# Patient Record
Sex: FEMALE | Race: WHITE | NOT HISPANIC OR LATINO
[De-identification: names, ages, dates, MRNs, and addresses within clinical notes are randomized per-mention and may not be internally consistent; named-entity substitution may affect disease eponyms.]

---

## 2017-03-09 ENCOUNTER — APPOINTMENT (OUTPATIENT)
Dept: FAMILY MEDICINE | Facility: CLINIC | Age: 52
End: 2017-03-09

## 2017-03-09 VITALS
HEIGHT: 65 IN | WEIGHT: 177 LBS | BODY MASS INDEX: 29.49 KG/M2 | SYSTOLIC BLOOD PRESSURE: 118 MMHG | DIASTOLIC BLOOD PRESSURE: 72 MMHG

## 2017-03-30 ENCOUNTER — APPOINTMENT (OUTPATIENT)
Dept: FAMILY MEDICINE | Facility: CLINIC | Age: 52
End: 2017-03-30

## 2017-04-05 ENCOUNTER — RX RENEWAL (OUTPATIENT)
Age: 52
End: 2017-04-05

## 2017-04-11 ENCOUNTER — APPOINTMENT (OUTPATIENT)
Dept: FAMILY MEDICINE | Facility: CLINIC | Age: 52
End: 2017-04-11

## 2017-04-11 ENCOUNTER — MED ADMIN CHARGE (OUTPATIENT)
Age: 52
End: 2017-04-11

## 2017-04-11 VITALS
BODY MASS INDEX: 29.49 KG/M2 | SYSTOLIC BLOOD PRESSURE: 172 MMHG | DIASTOLIC BLOOD PRESSURE: 92 MMHG | HEIGHT: 65 IN | WEIGHT: 177 LBS

## 2017-04-11 RX ORDER — TRIMETHOBENZAMIDE HYDROCHLORIDE 100 MG/ML
100 INJECTION INTRAMUSCULAR
Qty: 0 | Refills: 0 | Status: COMPLETED | OUTPATIENT
Start: 2017-04-11

## 2017-04-11 RX ADMIN — TRIMETHOBENZAMIDE HYDROCHLORIDE 0 MG/ML: 100 INJECTION INTRAMUSCULAR at 00:00

## 2017-05-08 ENCOUNTER — RX RENEWAL (OUTPATIENT)
Age: 52
End: 2017-05-08

## 2017-06-09 ENCOUNTER — RX RENEWAL (OUTPATIENT)
Age: 52
End: 2017-06-09

## 2017-06-19 ENCOUNTER — RX RENEWAL (OUTPATIENT)
Age: 52
End: 2017-06-19

## 2017-07-28 ENCOUNTER — RX RENEWAL (OUTPATIENT)
Age: 52
End: 2017-07-28

## 2017-08-01 ENCOUNTER — OUTPATIENT (OUTPATIENT)
Dept: OUTPATIENT SERVICES | Facility: HOSPITAL | Age: 52
LOS: 1 days | End: 2017-08-01
Payer: MEDICAID

## 2017-08-03 DIAGNOSIS — R69 ILLNESS, UNSPECIFIED: ICD-10-CM

## 2017-09-05 ENCOUNTER — RX RENEWAL (OUTPATIENT)
Age: 52
End: 2017-09-05

## 2017-09-08 ENCOUNTER — APPOINTMENT (OUTPATIENT)
Dept: FAMILY MEDICINE | Facility: CLINIC | Age: 52
End: 2017-09-08
Payer: MEDICAID

## 2017-09-08 VITALS
BODY MASS INDEX: 28.32 KG/M2 | WEIGHT: 170 LBS | DIASTOLIC BLOOD PRESSURE: 76 MMHG | HEIGHT: 65 IN | SYSTOLIC BLOOD PRESSURE: 136 MMHG

## 2017-09-08 PROCEDURE — 99213 OFFICE O/P EST LOW 20 MIN: CPT

## 2017-09-08 RX ORDER — VANCOMYCIN HYDROCHLORIDE 250 MG/1
250 CAPSULE ORAL 4 TIMES DAILY
Qty: 40 | Refills: 0 | Status: DISCONTINUED | COMMUNITY
Start: 2017-03-09 | End: 2017-09-08

## 2017-10-01 PROCEDURE — G9001: CPT

## 2017-10-02 ENCOUNTER — RX RENEWAL (OUTPATIENT)
Age: 52
End: 2017-10-02

## 2017-10-25 ENCOUNTER — RX RENEWAL (OUTPATIENT)
Age: 52
End: 2017-10-25

## 2017-10-26 ENCOUNTER — RX RENEWAL (OUTPATIENT)
Age: 52
End: 2017-10-26

## 2017-12-03 ENCOUNTER — RX RENEWAL (OUTPATIENT)
Age: 52
End: 2017-12-03

## 2017-12-18 ENCOUNTER — RX RENEWAL (OUTPATIENT)
Age: 52
End: 2017-12-18

## 2017-12-22 ENCOUNTER — APPOINTMENT (OUTPATIENT)
Dept: FAMILY MEDICINE | Facility: CLINIC | Age: 52
End: 2017-12-22
Payer: MEDICAID

## 2017-12-22 VITALS
SYSTOLIC BLOOD PRESSURE: 150 MMHG | WEIGHT: 170 LBS | HEIGHT: 65 IN | DIASTOLIC BLOOD PRESSURE: 80 MMHG | BODY MASS INDEX: 28.32 KG/M2

## 2017-12-22 LAB — CYTOLOGY CVX/VAG DOC THIN PREP: NORMAL

## 2017-12-22 PROCEDURE — 99214 OFFICE O/P EST MOD 30 MIN: CPT | Mod: 25

## 2017-12-22 PROCEDURE — 36415 COLL VENOUS BLD VENIPUNCTURE: CPT

## 2017-12-22 RX ORDER — ESCITALOPRAM OXALATE 10 MG/1
10 TABLET ORAL DAILY
Qty: 30 | Refills: 0 | Status: DISCONTINUED | COMMUNITY
Start: 2017-03-09 | End: 2017-12-22

## 2017-12-22 RX ORDER — FLUCONAZOLE 150 MG/1
150 TABLET ORAL
Qty: 1 | Refills: 0 | Status: DISCONTINUED | COMMUNITY
Start: 2017-09-08 | End: 2017-12-22

## 2017-12-22 RX ORDER — OXYCODONE AND ACETAMINOPHEN 5; 325 MG/1; MG/1
5-325 TABLET ORAL
Qty: 6 | Refills: 0 | Status: COMPLETED | COMMUNITY
Start: 2017-09-05

## 2017-12-22 RX ORDER — ONDANSETRON HYDROCHLORIDE 8 MG/1
8 TABLET, FILM COATED ORAL 3 TIMES DAILY
Refills: 0 | Status: DISCONTINUED | COMMUNITY
Start: 2017-03-09 | End: 2017-12-22

## 2017-12-22 RX ORDER — OMEPRAZOLE 40 MG/1
40 CAPSULE, DELAYED RELEASE ORAL
Qty: 30 | Refills: 0 | Status: COMPLETED | COMMUNITY
Start: 2017-04-18

## 2017-12-22 RX ORDER — CLONAZEPAM 1 MG/1
1 TABLET ORAL
Qty: 6 | Refills: 0 | Status: COMPLETED | COMMUNITY
Start: 2017-09-05

## 2017-12-23 LAB — POTASSIUM SERPL-SCNC: 3.4 MMOL/L

## 2018-01-16 ENCOUNTER — RX RENEWAL (OUTPATIENT)
Age: 53
End: 2018-01-16

## 2018-01-17 ENCOUNTER — APPOINTMENT (OUTPATIENT)
Dept: FAMILY MEDICINE | Facility: CLINIC | Age: 53
End: 2018-01-17
Payer: MEDICAID

## 2018-01-17 VITALS
OXYGEN SATURATION: 98 % | SYSTOLIC BLOOD PRESSURE: 122 MMHG | DIASTOLIC BLOOD PRESSURE: 72 MMHG | HEIGHT: 65 IN | BODY MASS INDEX: 26.66 KG/M2 | HEART RATE: 93 BPM | WEIGHT: 160 LBS

## 2018-01-17 PROCEDURE — 99213 OFFICE O/P EST LOW 20 MIN: CPT

## 2018-01-17 RX ORDER — CIPROFLOXACIN HYDROCHLORIDE 500 MG/1
500 TABLET, FILM COATED ORAL
Qty: 14 | Refills: 0 | Status: DISCONTINUED | COMMUNITY
Start: 2017-12-15

## 2018-04-01 ENCOUNTER — OUTPATIENT (OUTPATIENT)
Dept: OUTPATIENT SERVICES | Facility: HOSPITAL | Age: 53
LOS: 1 days | End: 2018-04-01
Payer: MEDICAID

## 2018-04-01 PROCEDURE — G9001: CPT

## 2018-04-20 DIAGNOSIS — R69 ILLNESS, UNSPECIFIED: ICD-10-CM

## 2018-05-14 ENCOUNTER — APPOINTMENT (OUTPATIENT)
Dept: FAMILY MEDICINE | Facility: CLINIC | Age: 53
End: 2018-05-14
Payer: MEDICAID

## 2018-05-14 VITALS — HEIGHT: 65 IN | WEIGHT: 153 LBS | BODY MASS INDEX: 25.49 KG/M2 | TEMPERATURE: 98.3 F

## 2018-05-14 PROCEDURE — 99213 OFFICE O/P EST LOW 20 MIN: CPT | Mod: 25

## 2018-05-14 PROCEDURE — 96372 THER/PROPH/DIAG INJ SC/IM: CPT

## 2018-05-14 RX ORDER — TRIMETHOBENZAMIDE HYDROCHLORIDE 100 MG/ML
100 INJECTION INTRAMUSCULAR
Qty: 0 | Refills: 0 | Status: COMPLETED | OUTPATIENT
Start: 2018-05-14

## 2018-05-14 RX ADMIN — TRIMETHOBENZAMIDE HYDROCHLORIDE 0 MG/ML: 100 INJECTION INTRAMUSCULAR at 00:00

## 2018-06-01 RX ORDER — CLOBETASOL PROPIONATE 0.5 MG/G
0.05 AEROSOL, FOAM TOPICAL TWICE DAILY
Qty: 1 | Refills: 0 | Status: DISCONTINUED | COMMUNITY
Start: 2018-05-15 | End: 2018-06-01

## 2018-06-11 ENCOUNTER — MEDICATION RENEWAL (OUTPATIENT)
Age: 53
End: 2018-06-11

## 2018-06-11 RX ORDER — CLOBETASOL PROPIONATE 0.5 MG/G
0.05 CREAM TOPICAL TWICE DAILY
Qty: 1 | Refills: 0 | Status: DISCONTINUED | COMMUNITY
Start: 2018-06-01 | End: 2018-06-11

## 2018-07-09 ENCOUNTER — RX RENEWAL (OUTPATIENT)
Age: 53
End: 2018-07-09

## 2018-11-15 ENCOUNTER — APPOINTMENT (OUTPATIENT)
Dept: FAMILY MEDICINE | Facility: CLINIC | Age: 53
End: 2018-11-15
Payer: MEDICAID

## 2018-11-15 VITALS
HEART RATE: 119 BPM | WEIGHT: 160 LBS | RESPIRATION RATE: 18 BRPM | OXYGEN SATURATION: 98 % | DIASTOLIC BLOOD PRESSURE: 70 MMHG | HEIGHT: 65 IN | SYSTOLIC BLOOD PRESSURE: 120 MMHG | BODY MASS INDEX: 26.66 KG/M2

## 2018-11-15 VITALS — TEMPERATURE: 99.2 F

## 2018-11-15 PROCEDURE — 99213 OFFICE O/P EST LOW 20 MIN: CPT

## 2018-11-15 RX ORDER — HALOBETASOL PROPIONATE 0.5 MG/G
0.05 CREAM TOPICAL
Qty: 1 | Refills: 0 | Status: DISCONTINUED | COMMUNITY
Start: 2018-06-11 | End: 2018-11-15

## 2018-11-15 RX ORDER — METOPROLOL SUCCINATE 25 MG/1
25 TABLET, EXTENDED RELEASE ORAL DAILY
Qty: 90 | Refills: 0 | Status: DISCONTINUED | COMMUNITY
Start: 2017-03-09 | End: 2018-11-15

## 2018-11-15 RX ORDER — NAPROXEN 500 MG/1
500 TABLET ORAL
Qty: 20 | Refills: 0 | Status: DISCONTINUED | COMMUNITY
Start: 2018-01-17 | End: 2018-11-15

## 2018-11-15 RX ORDER — TEMAZEPAM 15 MG/1
15 CAPSULE ORAL
Qty: 30 | Refills: 0 | Status: DISCONTINUED | COMMUNITY
Start: 2017-12-22 | End: 2018-11-15

## 2018-11-15 RX ORDER — AMLODIPINE BESYLATE 5 MG/1
5 TABLET ORAL DAILY
Qty: 30 | Refills: 0 | Status: DISCONTINUED | COMMUNITY
Start: 2017-03-09 | End: 2018-11-15

## 2018-11-15 NOTE — HISTORY OF PRESENT ILLNESS
[FreeTextEntry1] : Here for follow-up and medication refills [de-identified] : Ms. Red presents for follow-up, needs medication refills. \siddharth Has history of Gastroparesis with multiple hospitalizations last year. Explains that she has been very nauseous, vomiting last night and worsening reflux.  States she vomited ~40 times between last night and today.  Sometimes clear, sometimes dark.  Ate pasta with red sauce last night and had some coca cola to settle stomach.  Has been taking Zofran for relief of nausea. Last dose ~12PM today.  \par \par Also reports episodes feeling very shakey and episodes of sweating.  Feels very anxious and took multiple hot showers today to calm herself down.    \siddharth Has been out of omeprazole.  \par \par Dr. Campos-Gastro last visit 3 months ago, other GI Dr. Shoemaker.\siddharth Stopped taking her BP medications because her readings have been okay. \par \par

## 2018-11-15 NOTE — PHYSICAL EXAM
[Well Nourished] : well nourished [Well Developed] : well developed [Normal Sclera/Conjunctiva] : normal sclera/conjunctiva [Normal Outer Ear/Nose] : the outer ears and nose were normal in appearance [No Respiratory Distress] : no respiratory distress  [Clear to Auscultation] : lungs were clear to auscultation bilaterally [No Accessory Muscle Use] : no accessory muscle use [Regular Rhythm] : with a regular rhythm [Normal S1, S2] : normal S1 and S2 [No Edema] : there was no peripheral edema [No Extremity Clubbing/Cyanosis] : no extremity clubbing/cyanosis [Soft] : abdomen soft [Non Tender] : non-tender [Non-distended] : non-distended [No Masses] : no abdominal mass palpated [Normal Bowel Sounds] : normal bowel sounds [Alert and Oriented x3] : oriented to person, place, and time [de-identified] : mild erythema throat, no exudates [de-identified] : tachycardic [de-identified] : appears anxious

## 2018-11-15 NOTE — REVIEW OF SYSTEMS
[Fever] : no fever [Chills] : no chills [Nausea] : nausea [Vomiting] : vomiting [Heartburn] : heartburn [Suicidal] : not suicidal [Insomnia] : no insomnia [Anxiety] : anxiety [Depression] : no depression [Negative] : Genitourinary [FreeTextEntry2] : sweating and shakey last night

## 2018-11-15 NOTE — PLAN
[FreeTextEntry1] : Advised patient to seek medical evaluation at ER for symptoms of multiple episodes of vomiting, given history of gastroparesis.  Patient most likely dehydrated, unable to tolerate fluids today.   Discussed risks of worsening symptoms, possible infection and death if not evaluated.  Patient expressed understanding. \par \par Advised patient needs to see GI.  \par \par Patient is also going to make appointment for therapy.  Patient would like to see someone for her anxiety symptoms.  Denies suicidal ideations, intention to harm self or others.  States her main symptom is anxiety.  \par \par Given short supply of Xanax for acute anxiety attack. Patient has tolerated Klonopin in the past without problem.\par I-stop checked.\par Advised patient she needs evaluation for worsening symptoms, may require a long term medication for anxiety management.  She expressed understanding.

## 2019-01-14 ENCOUNTER — APPOINTMENT (OUTPATIENT)
Dept: FAMILY MEDICINE | Facility: CLINIC | Age: 54
End: 2019-01-14
Payer: MEDICAID

## 2019-01-14 VITALS
SYSTOLIC BLOOD PRESSURE: 140 MMHG | WEIGHT: 160 LBS | HEART RATE: 102 BPM | HEIGHT: 65 IN | BODY MASS INDEX: 26.66 KG/M2 | DIASTOLIC BLOOD PRESSURE: 90 MMHG

## 2019-01-14 DIAGNOSIS — Z01.818 ENCOUNTER FOR OTHER PREPROCEDURAL EXAMINATION: ICD-10-CM

## 2019-01-14 DIAGNOSIS — Z87.440 PERSONAL HISTORY OF URINARY (TRACT) INFECTIONS: ICD-10-CM

## 2019-01-14 DIAGNOSIS — Z86.19 PERSONAL HISTORY OF OTHER INFECTIOUS AND PARASITIC DISEASES: ICD-10-CM

## 2019-01-14 PROCEDURE — 99215 OFFICE O/P EST HI 40 MIN: CPT

## 2019-01-14 NOTE — COUNSELING
[Behavioral health counseling provided] : behavioral health  [Engage in a relaxing activity] : Engage in a relaxing activity [Good understanding] : Patient has a good understanding of lifestyle changes and the steps needed to achieve self management goals [de-identified] : Stable.  Refill Rx, consider alternative relaxation techniques, including deep breathing, meditation, yoga, acupuncture. Allowed to vent, emotional support provided.\par

## 2019-01-14 NOTE — HISTORY OF PRESENT ILLNESS
[de-identified] : went to [facility] 3x for alcoholism and dehydration, 3 wks in a row, 1st time 6 days, 2nd tim3 3 days, then 3rd time just an ER visit\par has appt w/ psych at [facility]

## 2019-01-14 NOTE — PHYSICAL EXAM
[No Acute Distress] : no acute distress [Well Nourished] : well nourished [Normal Sclera/Conjunctiva] : normal sclera/conjunctiva [EOMI] : extraocular movements intact [No Respiratory Distress] : no respiratory distress  [Clear to Auscultation] : lungs were clear to auscultation bilaterally [No Accessory Muscle Use] : no accessory muscle use [Normal Rate] : normal rate  [Regular Rhythm] : with a regular rhythm [Normal Gait] : normal gait [Normal Affect] : the affect was normal [Alert and Oriented x3] : oriented to person, place, and time [Normal Insight/Judgement] : insight and judgment were intact [de-identified] : tachycardic

## 2019-01-25 ENCOUNTER — APPOINTMENT (OUTPATIENT)
Dept: FAMILY MEDICINE | Facility: CLINIC | Age: 54
End: 2019-01-25

## 2019-02-01 ENCOUNTER — APPOINTMENT (OUTPATIENT)
Dept: FAMILY MEDICINE | Facility: CLINIC | Age: 54
End: 2019-02-01

## 2019-02-06 ENCOUNTER — RX RENEWAL (OUTPATIENT)
Age: 54
End: 2019-02-06

## 2019-03-04 ENCOUNTER — RX RENEWAL (OUTPATIENT)
Age: 54
End: 2019-03-04

## 2019-04-09 ENCOUNTER — LABORATORY RESULT (OUTPATIENT)
Age: 54
End: 2019-04-09

## 2019-04-09 ENCOUNTER — APPOINTMENT (OUTPATIENT)
Dept: FAMILY MEDICINE | Facility: CLINIC | Age: 54
End: 2019-04-09
Payer: MEDICAID

## 2019-04-09 ENCOUNTER — NON-APPOINTMENT (OUTPATIENT)
Age: 54
End: 2019-04-09

## 2019-04-09 VITALS
WEIGHT: 160 LBS | BODY MASS INDEX: 26.66 KG/M2 | DIASTOLIC BLOOD PRESSURE: 80 MMHG | HEART RATE: 85 BPM | HEIGHT: 65 IN | RESPIRATION RATE: 18 BRPM | OXYGEN SATURATION: 98 % | SYSTOLIC BLOOD PRESSURE: 125 MMHG

## 2019-04-09 DIAGNOSIS — J30.2 OTHER SEASONAL ALLERGIC RHINITIS: ICD-10-CM

## 2019-04-09 PROCEDURE — 93000 ELECTROCARDIOGRAM COMPLETE: CPT

## 2019-04-09 PROCEDURE — 36415 COLL VENOUS BLD VENIPUNCTURE: CPT

## 2019-04-09 PROCEDURE — 99396 PREV VISIT EST AGE 40-64: CPT | Mod: 25

## 2019-04-09 RX ORDER — ALPRAZOLAM 0.25 MG/1
0.25 TABLET ORAL
Qty: 5 | Refills: 0 | Status: DISCONTINUED | COMMUNITY
Start: 2018-11-15 | End: 2019-04-09

## 2019-04-09 RX ORDER — DISULFIRAM 250 MG/1
250 TABLET ORAL DAILY
Qty: 30 | Refills: 3 | Status: DISCONTINUED | COMMUNITY
Start: 2019-01-14 | End: 2019-04-09

## 2019-04-09 RX ORDER — ONDANSETRON 4 MG/1
4 TABLET ORAL EVERY 6 HOURS
Qty: 20 | Refills: 0 | Status: DISCONTINUED | COMMUNITY
Start: 2017-12-22 | End: 2019-04-09

## 2019-04-09 RX ORDER — ONDANSETRON 4 MG/1
4 TABLET, ORALLY DISINTEGRATING ORAL EVERY 8 HOURS
Qty: 6 | Refills: 0 | Status: DISCONTINUED | COMMUNITY
Start: 2017-04-11 | End: 2019-04-09

## 2019-04-09 RX ORDER — HYDROXYZINE HYDROCHLORIDE 10 MG/1
10 TABLET ORAL
Qty: 14 | Refills: 0 | Status: DISCONTINUED | COMMUNITY
Start: 2019-01-03 | End: 2019-04-09

## 2019-04-09 NOTE — REVIEW OF SYSTEMS
[Itching] : itching [Insomnia] : insomnia [Negative] : ENT [Postnasal Drip] : no postnasal drip [de-identified] : some episodes of anxiety

## 2019-04-09 NOTE — PHYSICAL EXAM
[No Acute Distress] : no acute distress [Well Nourished] : well nourished [Well-Appearing] : well-appearing [Well Developed] : well developed [Normal Sclera/Conjunctiva] : normal sclera/conjunctiva [PERRL] : pupils equal round and reactive to light [Normal Oropharynx] : the oropharynx was normal [Normal TMs] : both tympanic membranes were normal [Supple] : supple [No Lymphadenopathy] : no lymphadenopathy [Thyroid Normal, No Nodules] : the thyroid was normal and there were no nodules present [No Respiratory Distress] : no respiratory distress  [Clear to Auscultation] : lungs were clear to auscultation bilaterally [No Accessory Muscle Use] : no accessory muscle use [Normal Rate] : normal rate  [Regular Rhythm] : with a regular rhythm [Normal S1, S2] : normal S1 and S2 [No Edema] : there was no peripheral edema [No Extremity Clubbing/Cyanosis] : no extremity clubbing/cyanosis [Normal Appearance] : normal in appearance [No Nipple Discharge] : no nipple discharge [No Axillary Lymphadenopathy] : no axillary lymphadenopathy [Soft] : abdomen soft [Non Tender] : non-tender [Non-distended] : non-distended [No Masses] : no abdominal mass palpated [Normal Bowel Sounds] : normal bowel sounds [Normal Affect] : the affect was normal [Alert and Oriented x3] : oriented to person, place, and time [Normal Mood] : the mood was normal [de-identified] : No sinus pressure

## 2019-04-09 NOTE — HISTORY OF PRESENT ILLNESS
[FreeTextEntry1] : Here for annual physical [de-identified] : Here for annual physical.  \par Finished with inpatient rehab 4 weeks, Mid March for alcohol abuse.  Setting up counseling with [facility], has been going to meetings.  Has not had any alcohol since mid february.  \par \par Had episode of Gastroparesis last week with nausea/episodes of nausea/vomitting-used Zofran.  Feeling better now.  Has not seen Gastroenterology in awhile.  Brings labwork from rehab.  Mild anemia, will recheck today. \par \par Notes periods have become shorter and more spread out, last one was in March.  Previously in December 2018, due to see Gyn.  \par \par Medications and allergies reviewed.  Taking buspar once daily, sometimes twice daily. \par \par \par

## 2019-04-09 NOTE — HEALTH RISK ASSESSMENT
[Patient reported mammogram was normal] : Patient reported mammogram was normal [Patient reported PAP Smear was normal] : Patient reported PAP Smear was normal [Intercurrent ED visits] : went to ED [Intercurrent hospitalizations] : was admitted to the hospital  [HIV test declined] : HIV test declined [Alone] : lives alone [# of Members in Household ___] :  household currently consist of [unfilled] member(s) [Single] : single [] : No [de-identified] : Alcohol withdrawl, rehab [de-identified] : Quit [YearQuit] : 2001 [de-identified] : None since mid February [de-identified] : Able to eat this week; limits caffeine [MammogramComments] : 2-3 years ago [ColonoscopyComments] : not yet [HIVComments] : had performed at rehab

## 2019-04-09 NOTE — PLAN
[FreeTextEntry1] : Will follow-up labwork. EKG reviewed, sinus, similar to prior, no changes. \par Following with [facility]. \par Sleep hygiene and anxiety discussed.  Advised patient can take Buspar twice daily as directed.  Sleep hygiene discussed, handout given.  \par \par Advised follow-up with GI for further evaluation of Gastroparesis, patient also needs colonoscopy for colon cancer screening. \par \par Breast exam performed today-Mammogram ordered.  Patient declined STD testing-recently performed at rehab facility. Referral given to Gyn for annual exam, patient perimenopausal.  \par \par Advised to call if any worsening symptoms. Ms. Red expressed understanding of the plan.

## 2019-04-10 LAB
25(OH)D3 SERPL-MCNC: 27 NG/ML
ALBUMIN SERPL ELPH-MCNC: 4.4 G/DL
ALP BLD-CCNC: 53 U/L
ALT SERPL-CCNC: 18 U/L
ANION GAP SERPL CALC-SCNC: 12 MMOL/L
APPEARANCE: ABNORMAL
AST SERPL-CCNC: 23 U/L
BASOPHILS # BLD AUTO: 0.06 K/UL
BASOPHILS NFR BLD AUTO: 1.2 %
BILIRUB SERPL-MCNC: 0.6 MG/DL
BILIRUBIN URINE: NEGATIVE
BLOOD URINE: NEGATIVE
BUN SERPL-MCNC: 11 MG/DL
CALCIUM SERPL-MCNC: 9.3 MG/DL
CHLORIDE SERPL-SCNC: 99 MMOL/L
CHOLEST SERPL-MCNC: 193 MG/DL
CHOLEST/HDLC SERPL: 2.3 RATIO
CO2 SERPL-SCNC: 27 MMOL/L
COLOR: YELLOW
CREAT SERPL-MCNC: 0.75 MG/DL
EOSINOPHIL # BLD AUTO: 0.15 K/UL
EOSINOPHIL NFR BLD AUTO: 3 %
ESTIMATED AVERAGE GLUCOSE: 100 MG/DL
GLUCOSE QUALITATIVE U: NEGATIVE
GLUCOSE SERPL-MCNC: 94 MG/DL
HBA1C MFR BLD HPLC: 5.1 %
HCT VFR BLD CALC: 32.8 %
HDLC SERPL-MCNC: 85 MG/DL
HGB BLD-MCNC: 10.4 G/DL
IMM GRANULOCYTES NFR BLD AUTO: 0.2 %
KETONES URINE: NEGATIVE
LDLC SERPL CALC-MCNC: 92 MG/DL
LEUKOCYTE ESTERASE URINE: NEGATIVE
LYMPHOCYTES # BLD AUTO: 1.54 K/UL
LYMPHOCYTES NFR BLD AUTO: 31.1 %
MAN DIFF?: NORMAL
MCHC RBC-ENTMCNC: 26.9 PG
MCHC RBC-ENTMCNC: 31.7 GM/DL
MCV RBC AUTO: 85 FL
MONOCYTES # BLD AUTO: 0.55 K/UL
MONOCYTES NFR BLD AUTO: 11.1 %
NEUTROPHILS # BLD AUTO: 2.64 K/UL
NEUTROPHILS NFR BLD AUTO: 53.4 %
NITRITE URINE: NEGATIVE
PH URINE: 6
PLATELET # BLD AUTO: 242 K/UL
POTASSIUM SERPL-SCNC: 3.7 MMOL/L
PROT SERPL-MCNC: 7.2 G/DL
PROTEIN URINE: ABNORMAL
RBC # BLD: 3.86 M/UL
RBC # FLD: 21 %
SODIUM SERPL-SCNC: 138 MMOL/L
SPECIFIC GRAVITY URINE: 1.03
TRIGL SERPL-MCNC: 79 MG/DL
TSH SERPL-ACNC: 1.76 UIU/ML
UROBILINOGEN URINE: NORMAL
WBC # FLD AUTO: 4.95 K/UL

## 2019-04-11 ENCOUNTER — RX RENEWAL (OUTPATIENT)
Age: 54
End: 2019-04-11

## 2019-04-11 ENCOUNTER — OTHER (OUTPATIENT)
Age: 54
End: 2019-04-11

## 2019-04-23 ENCOUNTER — RX RENEWAL (OUTPATIENT)
Age: 54
End: 2019-04-23

## 2019-05-06 ENCOUNTER — APPOINTMENT (OUTPATIENT)
Dept: FAMILY MEDICINE | Facility: CLINIC | Age: 54
End: 2019-05-06

## 2019-05-13 ENCOUNTER — RX RENEWAL (OUTPATIENT)
Age: 54
End: 2019-05-13

## 2019-05-22 ENCOUNTER — RX RENEWAL (OUTPATIENT)
Age: 54
End: 2019-05-22

## 2019-05-24 ENCOUNTER — APPOINTMENT (OUTPATIENT)
Dept: FAMILY MEDICINE | Facility: CLINIC | Age: 54
End: 2019-05-24

## 2019-06-03 ENCOUNTER — RX RENEWAL (OUTPATIENT)
Age: 54
End: 2019-06-03

## 2019-06-11 ENCOUNTER — APPOINTMENT (OUTPATIENT)
Dept: FAMILY MEDICINE | Facility: CLINIC | Age: 54
End: 2019-06-11

## 2019-06-17 ENCOUNTER — RX RENEWAL (OUTPATIENT)
Age: 54
End: 2019-06-17

## 2019-06-25 ENCOUNTER — RX RENEWAL (OUTPATIENT)
Age: 54
End: 2019-06-25

## 2019-07-07 PROBLEM — Z86.19 HISTORY OF CLOSTRIDIUM DIFFICILE COLITIS: Status: RESOLVED | Noted: 2017-03-09 | Resolved: 2019-07-07

## 2019-07-15 ENCOUNTER — RX RENEWAL (OUTPATIENT)
Age: 54
End: 2019-07-15

## 2019-07-26 ENCOUNTER — RX RENEWAL (OUTPATIENT)
Age: 54
End: 2019-07-26

## 2019-07-29 ENCOUNTER — APPOINTMENT (OUTPATIENT)
Dept: FAMILY MEDICINE | Facility: CLINIC | Age: 54
End: 2019-07-29
Payer: MEDICAID

## 2019-07-29 VITALS
HEIGHT: 65 IN | WEIGHT: 162 LBS | SYSTOLIC BLOOD PRESSURE: 122 MMHG | DIASTOLIC BLOOD PRESSURE: 80 MMHG | BODY MASS INDEX: 26.99 KG/M2

## 2019-07-29 PROCEDURE — 99214 OFFICE O/P EST MOD 30 MIN: CPT

## 2019-08-25 ENCOUNTER — RX RENEWAL (OUTPATIENT)
Age: 54
End: 2019-08-25

## 2019-08-26 ENCOUNTER — APPOINTMENT (OUTPATIENT)
Dept: ORTHOPEDIC SURGERY | Facility: CLINIC | Age: 54
End: 2019-08-26
Payer: MEDICAID

## 2019-08-26 VITALS
BODY MASS INDEX: 26.99 KG/M2 | SYSTOLIC BLOOD PRESSURE: 143 MMHG | HEART RATE: 116 BPM | WEIGHT: 162 LBS | DIASTOLIC BLOOD PRESSURE: 86 MMHG | HEIGHT: 65 IN

## 2019-08-26 DIAGNOSIS — K31.84 GASTROPARESIS: ICD-10-CM

## 2019-08-26 PROCEDURE — 99204 OFFICE O/P NEW MOD 45 MIN: CPT

## 2019-08-26 NOTE — PHYSICAL EXAM
[de-identified] : Constitutional: Well-nourished, well-developed, No acute distress\par Respiratory:  Good respiratory effort, no SOB\par Lymphatic: No regional lymphadenopathy, no lymphedema\par Psychiatric: Pleasant and normal affect, alert and oriented x3\par Skin: Clean dry and intact B/L UE/LE\par Musculoskeletal: normal except where as noted in regional exam\par \par Constitutional: Well-nourished, well-developed, No acute distress\par Respiratory:  Good respiratory effort, no SOB\par Lymphatic: No regional lymphadenopathy, no lymphedema\par Psychiatric: Pleasant and normal affect, alert and oriented x3\par Skin: Clean dry and intact B/L UE/LE\par Musculoskeletal: normal except where as noted in regional exam\par \par Cervical Spine Exam\par APPEARANCE: no marked deformities or malalignment, normal curvature, good posture\par POSITIVE TENDERNESS: + Bilateral upper trapezius, levator scapula, rhomboid major, and rhomboid minor, + spasm noted in the same muscles.\par NONTENDER: no bony midline tenderness.\par ROM: limited in all planes, most notably in flexion and sidebending due to pain\par RESISTIVE TESTING: painful 4/5 resisted ext, bilateral sidebending, rotation and shoulder shrug , 5/5 flexion \par SPECIAL TESTS: neg Spurling's b/l\par Vasc: 2+ radial pulse b/l\par Neuro: C5 - T1 intact to motor, DTRs 2+/4 biceps, triceps, brachioradialis\par Sensation: Intact to light touch throughout b/l UE\par \par Thoracic Spine Exam:\par \par normal curvature and normal alignment. good posture. no midline bony tenderness, + marked spasm and associated tenderness of bilateral paraspinal and periscapular muscles.  ROM mildly limited in sidebending and rotation due to noted spasm\par \par \par B/L Elbows:  No asymmetry, malalignment, or swelling, Full ROM, 5/5 strength in flexion/ext, pronation/supination, Joints stable\par B/L Wrist and Hand:  No asymmetry, malalignment, or swelling, Full ROM, 5/5 strength in wrist and long finger flexion/ext, radial/ulnar deviation, Joints stable\par \par Left Shoulder:\par APPEARANCE: no marked deformities, no swelling or malalignment\par POSITIVE TENDERNESS: supraspinatus\par NONTENDER:  infraspinatus, teres minor. biceps. anterior and posterior capsule. AC joint. \par ROM: full with mild painful arc past 60 degrees, no scapular winging or dyskinesia present\par RESISTIVE TESTING: MMT 4+/5 ER and empty can, 5/5 IR. painless 5/5 resisted flex/ext, horizontal abd/add \par SPECIAL TESTS: + Whitehead and Neers, mildly + cross arm adduction, neg Speeds, neg Nix's neg Drop Arm, neg Apprehension. neg apley's scratch test\par Vasc: 2+ radial pulse\par Neuro: AIN, PIN, Ulnar nerve intact to motor, DTRs 2+/4 biceps, triceps, brachioradialis\par Sensation: Intact to light touch throughout\par \par Right Shoulder:\par APPEARANCE: no marked deformities, no swelling or malalignment\par POSITIVE TENDERNESS: supraspinatus\par NONTENDER:  infraspinatus, teres minor. biceps. anterior and posterior capsule. AC joint. \par ROM: full with mild painful arc past 60 degrees, no scapular winging or dyskinesia present\par RESISTIVE TESTING: MMT 4+/5 ER and empty can, 5/5 IR. painless 5/5 resisted flex/ext, horizontal abd/add \par SPECIAL TESTS: + Whitehead and Neers, mildly + cross arm adduction, neg Speeds, neg Nix's neg Drop Arm, neg Apprehension. neg apley's scratch test\par Vasc: 2+ radial pulse\par Neuro: AIN, PIN, Ulnar nerve intact to motor, DTRs 2+/4 biceps, triceps, brachioradialis\par Sensation: Intact to light touch throughout\par \par Lumbar Spine Exam\par \par APPEARANCE: no marked deformities or malalignment, normal curvature of the lumbosacral spine. no marked deformities. good posture\par POSITIVE TENDERNESS: + Bilateral lumbar tenderness and spasm noted in erector spinae and quadratus lumborum\par NONTENDER: no bony midline tenderness\par ROM: full, although painful at end range of flexion and extension\par RESISTIVE TESTING: painless 5/5 resisted flex/ext, sidebending b/l, and rotation\par SPECIAL TESTS: neg SLR b/l, neg NANCIE b/l, neg Trendelenburg b/l \par PULSES: 2+ DP/PT pulses\par NEURO:  L1 - S2 intact to sensation and motor, DTRs 2+/4 patella and achilles\par \par B/L Hips: No asymmetry, malalignment, or swelling, Full ROM, 5/5 strength in flexion/ext, IR/ER, Abd/Add, Joints stable\par B/L Knees: No asymmetry, malalignment, or swelling, Full ROM, 5/5 strength in flexion/ext, Joints stable\par B/L Ankles: No asymmetry, malalignment, or swelling, Full ROM, 5/5 strength in DF/PF/Inv/Ev, Joints stable\par BIOMECHANICAL EXAM: no marked leg length discrepancy, moderate hip abductor weakness b/l, no marked foot pronation, Normal gait and station\par \par

## 2019-08-26 NOTE — CONSULT LETTER
[Dear  ___] : Dear  [unfilled], [Consult Letter:] : I had the pleasure of evaluating your patient, [unfilled]. [Please see my note below.] : Please see my note below. [Sincerely,] : Sincerely, [FreeTextEntry2] : PCP [FreeTextEntry3] : Jersey Winkler DO, ATC\par Primary Care Sports Medicine\par [facility]

## 2019-08-26 NOTE — DISCUSSION/SUMMARY
[de-identified] : Patient was seen today for her diffuse body pain is one month status post fall. She has spasm of the cervical, thoracic, and lumbar paraspinal muscles. These are secondary to her fall and resultant whiplash injury. Patient also has bilateral shoulder pain likely due to mild rotator cuff tendinitis and subacromial impingement. Patient does not appear to have any acute tendon tears, or other significant pathology that'll require surgical intervention. At this time recommend a course of physical therapy as patient has not been doing any treatment for the last 4 weeks since her fall. Patient will continue take over-the-counter medications as needed for pain, she has history of gastroparesis, would recommend Tylenol extra strength as needed for pain. Patient still has muscle relaxants from her ER visit, she is advised that these causes drowsiness/sedation and should be taken in the evenings only. Recommend followup in one month if she has any persisting pain.  Patient appreciates and agrees with current plan.\par \par This note was generated using dragon medical dictation software.  A reasonable effort has been made for proofreading its contents, but typos may still remain.  If there are any questions or points of clarification needed please notify my office.

## 2019-08-27 ENCOUNTER — RX RENEWAL (OUTPATIENT)
Age: 54
End: 2019-08-27

## 2019-08-30 ENCOUNTER — APPOINTMENT (OUTPATIENT)
Dept: FAMILY MEDICINE | Facility: CLINIC | Age: 54
End: 2019-08-30
Payer: MEDICAID

## 2019-08-30 VITALS
DIASTOLIC BLOOD PRESSURE: 80 MMHG | BODY MASS INDEX: 26.99 KG/M2 | TEMPERATURE: 98.3 F | WEIGHT: 162 LBS | SYSTOLIC BLOOD PRESSURE: 140 MMHG | HEART RATE: 123 BPM | HEIGHT: 65 IN | OXYGEN SATURATION: 98 %

## 2019-08-30 VITALS — OXYGEN SATURATION: 97 % | HEART RATE: 99 BPM

## 2019-08-30 VITALS — SYSTOLIC BLOOD PRESSURE: 126 MMHG | DIASTOLIC BLOOD PRESSURE: 76 MMHG

## 2019-08-30 PROCEDURE — 99214 OFFICE O/P EST MOD 30 MIN: CPT

## 2019-08-30 RX ORDER — MELOXICAM 15 MG/1
15 TABLET ORAL DAILY
Qty: 14 | Refills: 0 | Status: DISCONTINUED | COMMUNITY
Start: 2019-08-13 | End: 2019-08-30

## 2019-08-30 NOTE — REVIEW OF SYSTEMS
[Muscle Pain] : muscle pain [Negative] : Cardiovascular [FreeTextEntry4] : some episodes of nosebleeds [FreeTextEntry9] : Ongoing soreness in back, ankels.

## 2019-08-30 NOTE — HISTORY OF PRESENT ILLNESS
[FreeTextEntry1] : Here for follow-up [de-identified] : Here for follow-up, saw Sports medicine this week, follow up for muscle pain following fall.  Going to start PT Sept 9th. \par \siddharth Has been taking Motrin, meloxicam and muscle relaxers did not work.   Reports she has some mild nosebleeds when she blows her nose.  None today.  Also with some nasal tenderness.  Had facial CT at ER.\par Establishing with new therapist.  States she has not used any alcohol. \par \par Trying to make appointment with GI for further evaluation of gastroparesis.\par Medications and allergies reviewed.\par

## 2019-08-30 NOTE — PHYSICAL EXAM
[Normal Oropharynx] : the oropharynx was normal [Normal] : soft, non-tender, non-distended, no masses palpated, no HSM and normal bowel sounds [Normal Sclera/Conjunctiva] : normal sclera/conjunctiva [Normal Outer Ear/Nose] : the outer ears and nose were normal in appearance [Normal Nasal Mucosa] : the nasal mucosa was normal [No Edema] : there was no peripheral edema [No Extremity Clubbing/Cyanosis] : no extremity clubbing/cyanosis [de-identified] : no sinus tenderness to palpation; no facial bruising.   [de-identified] : Paraspinal tenderness to palpation, lumbar, thoracic and cervical areas.

## 2019-08-30 NOTE — PLAN
[FreeTextEntry1] : No active nasal bleeding.  Patient had facial CT performed at ER [facility] after fall. \par Advised ENT evaluation for nose bleeds. \par Repeat HR and BP improved. \par \par Has appt with new counselor next week. \par Advised to see GI for follow-up of Gastroparesis. \par Starting PT.\par Advised to try trial of heat/warm compresses for muscle tenderness as needed.  Advised to cover compress, not place directly on skin and not to apply for more than 15 minutes at a time.  Patient expressed understanding.\par

## 2019-09-20 ENCOUNTER — RX RENEWAL (OUTPATIENT)
Age: 54
End: 2019-09-20

## 2019-10-07 ENCOUNTER — APPOINTMENT (OUTPATIENT)
Dept: ORTHOPEDIC SURGERY | Facility: CLINIC | Age: 54
End: 2019-10-07

## 2019-10-22 ENCOUNTER — RX RENEWAL (OUTPATIENT)
Age: 54
End: 2019-10-22

## 2019-10-23 ENCOUNTER — RX RENEWAL (OUTPATIENT)
Age: 54
End: 2019-10-23

## 2019-10-25 ENCOUNTER — RX RENEWAL (OUTPATIENT)
Age: 54
End: 2019-10-25

## 2019-10-28 ENCOUNTER — RX RENEWAL (OUTPATIENT)
Age: 54
End: 2019-10-28

## 2019-10-31 ENCOUNTER — APPOINTMENT (OUTPATIENT)
Dept: FAMILY MEDICINE | Facility: CLINIC | Age: 54
End: 2019-10-31
Payer: MEDICAID

## 2019-10-31 VITALS
OXYGEN SATURATION: 97 % | RESPIRATION RATE: 17 BRPM | SYSTOLIC BLOOD PRESSURE: 160 MMHG | HEART RATE: 83 BPM | DIASTOLIC BLOOD PRESSURE: 70 MMHG | WEIGHT: 150 LBS | HEIGHT: 65 IN | BODY MASS INDEX: 24.99 KG/M2

## 2019-10-31 DIAGNOSIS — Z87.09 PERSONAL HISTORY OF OTHER DISEASES OF THE RESPIRATORY SYSTEM: ICD-10-CM

## 2019-10-31 DIAGNOSIS — M62.838 OTHER MUSCLE SPASM: ICD-10-CM

## 2019-10-31 PROCEDURE — 99214 OFFICE O/P EST MOD 30 MIN: CPT | Mod: 25

## 2019-10-31 PROCEDURE — G0442 ANNUAL ALCOHOL SCREEN 15 MIN: CPT

## 2019-10-31 NOTE — REVIEW OF SYSTEMS
[Insomnia] : insomnia [Anxiety] : anxiety [Depression] : depression [Negative] : Heme/Lymph [Suicidal] : not suicidal [FreeTextEntry2] : terrible [de-identified] : tank

## 2019-10-31 NOTE — HISTORY OF PRESENT ILLNESS
[FreeTextEntry8] : crying, seeing therapist Delfina Hurley\par overwhelmed, stressed, financial issues, not working x 3 yrs\par still drinking, does not want to go to AA, too embarrassed\par aware of high bp\par c/o rt sided body pain after falling, saw ortho, to f/u w/ PT

## 2019-10-31 NOTE — COUNSELING
[Strategies to reduce or eliminate alcohol use discussed] : Strategies to reduce or eliminate alcohol use discussed [Support options provided] : Support options provided [Quit Drinking] : Quit Drinking [Participate in Treatment Program] : Participate in treatment program [FreeTextEntry2] : Refusing AA, will cont individual therapy [FreeTextEntry1] : 20

## 2019-10-31 NOTE — PHYSICAL EXAM
[Well Nourished] : well nourished [Normal Sclera/Conjunctiva] : normal sclera/conjunctiva [EOMI] : extraocular movements intact [Normal Outer Ear/Nose] : the outer ears and nose were normal in appearance [No JVD] : no jugular venous distention [Normal] : normal rate, regular rhythm, normal S1 and S2 and no murmur heard [Coordination Grossly Intact] : coordination grossly intact [Normal Affect] : the affect was normal [Alert and Oriented x3] : oriented to person, place, and time [Normal Insight/Judgement] : insight and judgment were intact [de-identified] : crying [de-identified] : rt sided body tenderness w/ movement [de-identified] : tremors [de-identified] : crying

## 2019-11-01 LAB
25(OH)D3 SERPL-MCNC: 21.9 NG/ML
ALBUMIN SERPL ELPH-MCNC: 4.9 G/DL
ALP BLD-CCNC: 78 U/L
ALT SERPL-CCNC: 13 U/L
ANION GAP SERPL CALC-SCNC: 23 MMOL/L
AST SERPL-CCNC: 29 U/L
BASOPHILS # BLD AUTO: 0.07 K/UL
BASOPHILS NFR BLD AUTO: 1.2 %
BILIRUB SERPL-MCNC: 0.5 MG/DL
BUN SERPL-MCNC: 10 MG/DL
CALCIUM SERPL-MCNC: 9.3 MG/DL
CHLORIDE SERPL-SCNC: 98 MMOL/L
CO2 SERPL-SCNC: 18 MMOL/L
CREAT SERPL-MCNC: 0.72 MG/DL
EOSINOPHIL # BLD AUTO: 0.03 K/UL
EOSINOPHIL NFR BLD AUTO: 0.5 %
FERRITIN SERPL-MCNC: 15 NG/ML
FOLATE SERPL-MCNC: 9.8 NG/ML
GLUCOSE SERPL-MCNC: 108 MG/DL
HCT VFR BLD CALC: 40.2 %
HGB BLD-MCNC: 12.2 G/DL
IMM GRANULOCYTES NFR BLD AUTO: 0.2 %
IRON SATN MFR SERPL: 14 %
IRON SERPL-MCNC: 65 UG/DL
LYMPHOCYTES # BLD AUTO: 1.57 K/UL
LYMPHOCYTES NFR BLD AUTO: 27.1 %
MAN DIFF?: NORMAL
MCHC RBC-ENTMCNC: 26.5 PG
MCHC RBC-ENTMCNC: 30.3 GM/DL
MCV RBC AUTO: 87.2 FL
MONOCYTES # BLD AUTO: 0.43 K/UL
MONOCYTES NFR BLD AUTO: 7.4 %
NEUTROPHILS # BLD AUTO: 3.68 K/UL
NEUTROPHILS NFR BLD AUTO: 63.6 %
PLATELET # BLD AUTO: 292 K/UL
POTASSIUM SERPL-SCNC: 3.6 MMOL/L
PROT SERPL-MCNC: 8 G/DL
RBC # BLD: 4.61 M/UL
RBC # FLD: 19.9 %
SODIUM SERPL-SCNC: 139 MMOL/L
TIBC SERPL-MCNC: 451 UG/DL
TSH SERPL-ACNC: 3.14 UIU/ML
UIBC SERPL-MCNC: 386 UG/DL
VIT B12 SERPL-MCNC: 325 PG/ML
WBC # FLD AUTO: 5.79 K/UL

## 2019-11-18 ENCOUNTER — RX RENEWAL (OUTPATIENT)
Age: 54
End: 2019-11-18

## 2019-11-21 ENCOUNTER — APPOINTMENT (OUTPATIENT)
Dept: FAMILY MEDICINE | Facility: CLINIC | Age: 54
End: 2019-11-21
Payer: MEDICAID

## 2019-11-21 VITALS
BODY MASS INDEX: 25.83 KG/M2 | DIASTOLIC BLOOD PRESSURE: 72 MMHG | HEIGHT: 65 IN | SYSTOLIC BLOOD PRESSURE: 132 MMHG | TEMPERATURE: 98.6 F | HEART RATE: 96 BPM | WEIGHT: 155 LBS | OXYGEN SATURATION: 100 %

## 2019-11-21 DIAGNOSIS — M62.830 MUSCLE SPASM OF BACK: ICD-10-CM

## 2019-11-21 DIAGNOSIS — R92.2 INCONCLUSIVE MAMMOGRAM: ICD-10-CM

## 2019-11-21 PROCEDURE — 99214 OFFICE O/P EST MOD 30 MIN: CPT

## 2019-11-21 RX ORDER — CYCLOBENZAPRINE HYDROCHLORIDE 10 MG/1
10 TABLET, FILM COATED ORAL
Qty: 30 | Refills: 0 | Status: DISCONTINUED | COMMUNITY
Start: 2019-08-13 | End: 2019-11-21

## 2019-11-21 NOTE — HISTORY OF PRESENT ILLNESS
[FreeTextEntry1] : c/o persistent rt hip/gluteal pain, hasn't started PT since it's too far, and she has no car [de-identified] : here for follow up\par stopped drinking since our last visit\par feels better, had stomach bug last wk for 3-4 days, restarted meds this wk\par would consider increase in lexapro\par seeing therapist\par also here to repeat vitamin levels after starting supplements

## 2019-11-22 LAB
25(OH)D3 SERPL-MCNC: 27 NG/ML
FERRITIN SERPL-MCNC: 37 NG/ML
FOLATE SERPL-MCNC: 7 NG/ML
IRON SERPL-MCNC: 117 UG/DL
POTASSIUM SERPL-SCNC: 4.1 MMOL/L
VIT B12 SERPL-MCNC: 663 PG/ML

## 2019-11-27 ENCOUNTER — APPOINTMENT (OUTPATIENT)
Dept: INTERNAL MEDICINE | Facility: CLINIC | Age: 54
End: 2019-11-27
Payer: MEDICAID

## 2019-11-27 VITALS
HEART RATE: 78 BPM | DIASTOLIC BLOOD PRESSURE: 91 MMHG | WEIGHT: 155 LBS | BODY MASS INDEX: 25.83 KG/M2 | HEIGHT: 65 IN | SYSTOLIC BLOOD PRESSURE: 165 MMHG

## 2019-11-27 PROCEDURE — 99204 OFFICE O/P NEW MOD 45 MIN: CPT

## 2019-11-27 RX ORDER — LORATADINE 10 MG/1
10 CAPSULE, LIQUID FILLED ORAL DAILY
Qty: 30 | Refills: 0 | Status: DISCONTINUED | COMMUNITY
Start: 2019-04-09 | End: 2019-11-27

## 2019-11-27 RX ORDER — ONDANSETRON 4 MG/1
4 TABLET ORAL
Qty: 1 | Refills: 0 | Status: DISCONTINUED | COMMUNITY
Start: 2017-09-05 | End: 2019-11-27

## 2019-11-27 NOTE — HISTORY OF PRESENT ILLNESS
[FreeTextEntry1] :  2 weeks ago had bout of vomiting. use omeprazole 40mg daily or BID.  Last time had EGD 2 years and never had colonoscopy taking hot shower helps her feel better. now happening less often since she stopped drinking\par  2 years ago in hospital numerous times with gastroparesis- sees Dr. Campos while in hospital. She has episodes where she gets sick and vomits. also has had diverticulitis and colitis. also diagnosed with gastric ulcer . H/o alcohol abuse\par   28 years 30 yo boys no grandchildren.

## 2019-12-13 ENCOUNTER — RX RENEWAL (OUTPATIENT)
Age: 54
End: 2019-12-13

## 2019-12-18 ENCOUNTER — RX RENEWAL (OUTPATIENT)
Age: 54
End: 2019-12-18

## 2019-12-23 ENCOUNTER — APPOINTMENT (OUTPATIENT)
Dept: FAMILY MEDICINE | Facility: CLINIC | Age: 54
End: 2019-12-23
Payer: MEDICAID

## 2019-12-23 VITALS
HEIGHT: 65 IN | HEART RATE: 80 BPM | OXYGEN SATURATION: 97 % | WEIGHT: 155 LBS | DIASTOLIC BLOOD PRESSURE: 96 MMHG | BODY MASS INDEX: 25.83 KG/M2 | RESPIRATION RATE: 16 BRPM | SYSTOLIC BLOOD PRESSURE: 132 MMHG

## 2019-12-23 DIAGNOSIS — R20.0 ANESTHESIA OF SKIN: ICD-10-CM

## 2019-12-23 DIAGNOSIS — Z09 ENCOUNTER FOR FOLLOW-UP EXAMINATION AFTER COMPLETED TREATMENT FOR CONDITIONS OTHER THAN MALIGNANT NEOPLASM: ICD-10-CM

## 2019-12-23 DIAGNOSIS — Z87.828 PERSONAL HISTORY OF OTHER (HEALED) PHYSICAL INJURY AND TRAUMA: ICD-10-CM

## 2019-12-23 PROCEDURE — 96372 THER/PROPH/DIAG INJ SC/IM: CPT

## 2019-12-23 PROCEDURE — 99214 OFFICE O/P EST MOD 30 MIN: CPT | Mod: 25

## 2019-12-23 PROCEDURE — 36415 COLL VENOUS BLD VENIPUNCTURE: CPT

## 2019-12-23 RX ORDER — METHYLPRED ACET/NACL,ISO-OS/PF 40 MG/ML
40 VIAL (ML) INJECTION
Qty: 1 | Refills: 0 | Status: COMPLETED | OUTPATIENT
Start: 2019-12-23

## 2019-12-23 RX ADMIN — METHYLPREDNISOLONE ACETATE 0 MG/ML: 40 INJECTION, SUSPENSION INTRA-ARTICULAR; INTRALESIONAL; INTRAMUSCULAR; SOFT TISSUE at 00:00

## 2019-12-23 NOTE — HISTORY OF PRESENT ILLNESS
[FreeTextEntry1] : c/o bruising to left arm, rt groin, left legs\par taking her vitamins\par c/o bad rt sciatica, hasn't gone to PT, due to lack of method of transportation

## 2019-12-23 NOTE — PHYSICAL EXAM
[Well Nourished] : well nourished [No Acute Distress] : no acute distress [Normal Sclera/Conjunctiva] : normal sclera/conjunctiva [EOMI] : extraocular movements intact [Normal Outer Ear/Nose] : the outer ears and nose were normal in appearance [No Accessory Muscle Use] : no accessory muscle use [No Respiratory Distress] : no respiratory distress  [No JVD] : no jugular venous distention [Coordination Grossly Intact] : coordination grossly intact [Normal Affect] : the affect was normal [Alert and Oriented x3] : oriented to person, place, and time [Normal Insight/Judgement] : insight and judgment were intact [de-identified] : ecchymosis noted to left forearm, rt groin, and left calf

## 2019-12-23 NOTE — REVIEW OF SYSTEMS
[Anxiety] : anxiety [Insomnia] : insomnia [Negative] : Psychiatric [de-identified] : bruising [Suicidal] : not suicidal

## 2019-12-27 LAB
25(OH)D3 SERPL-MCNC: 29.4 NG/ML
APTT BLD: 30.8 SEC
BASOPHILS # BLD AUTO: 0.04 K/UL
BASOPHILS NFR BLD AUTO: 0.9 %
EOSINOPHIL # BLD AUTO: 0.05 K/UL
EOSINOPHIL NFR BLD AUTO: 1.2 %
FOLATE SERPL-MCNC: 8.4 NG/ML
HCT VFR BLD CALC: 41 %
HGB BLD-MCNC: 12.3 G/DL
IMM GRANULOCYTES NFR BLD AUTO: 0 %
INR PPP: 0.88 RATIO
LYMPHOCYTES # BLD AUTO: 1.61 K/UL
LYMPHOCYTES NFR BLD AUTO: 37.3 %
MAN DIFF?: NORMAL
MCHC RBC-ENTMCNC: 27.7 PG
MCHC RBC-ENTMCNC: 30 GM/DL
MCV RBC AUTO: 92.3 FL
MONOCYTES # BLD AUTO: 0.4 K/UL
MONOCYTES NFR BLD AUTO: 9.3 %
NEUTROPHILS # BLD AUTO: 2.22 K/UL
NEUTROPHILS NFR BLD AUTO: 51.3 %
PLATELET # BLD AUTO: 173 K/UL
PT BLD: 10 SEC
RBC # BLD: 4.44 M/UL
RBC # FLD: 23.1 %
VIT B12 SERPL-MCNC: 431 PG/ML
WBC # FLD AUTO: 4.32 K/UL

## 2020-01-09 ENCOUNTER — RX RENEWAL (OUTPATIENT)
Age: 55
End: 2020-01-09

## 2020-01-13 ENCOUNTER — RX RENEWAL (OUTPATIENT)
Age: 55
End: 2020-01-13

## 2020-01-16 ENCOUNTER — RX RENEWAL (OUTPATIENT)
Age: 55
End: 2020-01-16

## 2020-02-02 ENCOUNTER — RX RENEWAL (OUTPATIENT)
Age: 55
End: 2020-02-02

## 2020-02-14 ENCOUNTER — APPOINTMENT (OUTPATIENT)
Dept: INTERNAL MEDICINE | Facility: AMBULATORY MEDICAL SERVICES | Age: 55
End: 2020-02-14
Payer: MEDICAID

## 2020-02-14 PROCEDURE — 45385 COLONOSCOPY W/LESION REMOVAL: CPT | Mod: PT

## 2020-02-14 PROCEDURE — 43239 EGD BIOPSY SINGLE/MULTIPLE: CPT

## 2020-02-24 ENCOUNTER — RX RENEWAL (OUTPATIENT)
Age: 55
End: 2020-02-24

## 2020-02-27 ENCOUNTER — RX RENEWAL (OUTPATIENT)
Age: 55
End: 2020-02-27

## 2020-02-27 RX ORDER — SODIUM PICOSULFATE, MAGNESIUM OXIDE, AND ANHYDROUS CITRIC ACID 10; 3.5; 12 MG/160ML; G/160ML; G/160ML
10-3.5-12 MG-GM LIQUID ORAL TWICE DAILY
Qty: 1 | Refills: 0 | Status: COMPLETED | COMMUNITY
Start: 2020-02-12 | End: 2020-02-27

## 2020-03-09 ENCOUNTER — RX RENEWAL (OUTPATIENT)
Age: 55
End: 2020-03-09

## 2020-03-13 ENCOUNTER — RX RENEWAL (OUTPATIENT)
Age: 55
End: 2020-03-13

## 2020-03-23 ENCOUNTER — RX RENEWAL (OUTPATIENT)
Age: 55
End: 2020-03-23

## 2020-04-15 ENCOUNTER — RX RENEWAL (OUTPATIENT)
Age: 55
End: 2020-04-15

## 2020-04-27 ENCOUNTER — RX RENEWAL (OUTPATIENT)
Age: 55
End: 2020-04-27

## 2020-05-11 ENCOUNTER — RX RENEWAL (OUTPATIENT)
Age: 55
End: 2020-05-11

## 2020-05-13 ENCOUNTER — RX RENEWAL (OUTPATIENT)
Age: 55
End: 2020-05-13

## 2020-06-03 ENCOUNTER — RX RENEWAL (OUTPATIENT)
Age: 55
End: 2020-06-03

## 2020-06-08 ENCOUNTER — RX RENEWAL (OUTPATIENT)
Age: 55
End: 2020-06-08

## 2020-06-12 ENCOUNTER — RX RENEWAL (OUTPATIENT)
Age: 55
End: 2020-06-12

## 2020-06-15 ENCOUNTER — RX RENEWAL (OUTPATIENT)
Age: 55
End: 2020-06-15

## 2020-06-16 ENCOUNTER — RX RENEWAL (OUTPATIENT)
Age: 55
End: 2020-06-16

## 2020-06-28 ENCOUNTER — RX RENEWAL (OUTPATIENT)
Age: 55
End: 2020-06-28

## 2020-07-02 ENCOUNTER — RX RENEWAL (OUTPATIENT)
Age: 55
End: 2020-07-02

## 2020-07-06 ENCOUNTER — APPOINTMENT (OUTPATIENT)
Dept: FAMILY MEDICINE | Facility: CLINIC | Age: 55
End: 2020-07-06
Payer: MEDICAID

## 2020-07-06 PROCEDURE — 99214 OFFICE O/P EST MOD 30 MIN: CPT | Mod: 95

## 2020-07-06 RX ORDER — TRAZODONE HYDROCHLORIDE 50 MG/1
50 TABLET ORAL
Qty: 30 | Refills: 0 | Status: DISCONTINUED | COMMUNITY
Start: 2020-01-13 | End: 2020-07-06

## 2020-07-06 RX ORDER — TRAZODONE HYDROCHLORIDE 100 MG/1
100 TABLET ORAL
Qty: 60 | Refills: 0 | Status: DISCONTINUED | COMMUNITY
Start: 2019-11-21 | End: 2020-07-06

## 2020-07-06 RX ORDER — GLUCOSAMINE HCL/CHONDROITIN SU 500-400 MG
3 CAPSULE ORAL
Qty: 100 | Refills: 0 | Status: ACTIVE | COMMUNITY
Start: 2020-07-06 | End: 1900-01-01

## 2020-07-08 NOTE — HISTORY OF PRESENT ILLNESS
[Home] : at home, [unfilled] , at the time of the visit. [Medical Office: ([facility])___] : at the medical office located in  [Verbal consent obtained from patient] : the patient, [unfilled] [FreeTextEntry1] : c/o relapsing, started drinking again, shots at a time\par would like buspar refill\par seeing therapist qwk\par fell over the other day, broke some teeth\par last drink 3 days ago [de-identified] : had seen GI, had egd and colonoscopy done\par cyclic vomiting syndrome\par strained relationship w/ mom\par son is upset at her

## 2020-07-08 NOTE — PHYSICAL EXAM
[Normal Sclera/Conjunctiva] : normal sclera/conjunctiva [Normal Outer Ear/Nose] : the outer ears and nose were normal in appearance [No Acute Distress] : no acute distress [No Respiratory Distress] : no respiratory distress  [No Accessory Muscle Use] : no accessory muscle use [Coordination Grossly Intact] : coordination grossly intact [de-identified] : crying

## 2020-07-08 NOTE — COUNSELING
[Fall prevention counseling provided] : Fall prevention counseling provided [Plan in advance] : Plan in advance [Behavioral health counseling provided] : Behavioral health counseling provided [Strategies to reduce or eliminate alcohol use discussed] : Strategies to reduce or eliminate alcohol use discussed [Support options provided] : Support options provided [Participate in Treatment Program] : Participate in treatment program [FreeTextEntry1] : 20

## 2020-07-09 ENCOUNTER — RX RENEWAL (OUTPATIENT)
Age: 55
End: 2020-07-09

## 2020-07-23 ENCOUNTER — RX RENEWAL (OUTPATIENT)
Age: 55
End: 2020-07-23

## 2020-08-13 ENCOUNTER — APPOINTMENT (OUTPATIENT)
Dept: FAMILY MEDICINE | Facility: CLINIC | Age: 55
End: 2020-08-13
Payer: MEDICAID

## 2020-08-13 DIAGNOSIS — M53.3 SACROCOCCYGEAL DISORDERS, NOT ELSEWHERE CLASSIFIED: ICD-10-CM

## 2020-08-13 DIAGNOSIS — R63.4 ABNORMAL WEIGHT LOSS: ICD-10-CM

## 2020-08-13 PROCEDURE — G0443: CPT | Mod: 95

## 2020-08-13 PROCEDURE — 99214 OFFICE O/P EST MOD 30 MIN: CPT | Mod: 25,95

## 2020-08-13 RX ORDER — POLYETHYLENE GLYCOL 3350, SODIUM SULFATE ANHYDROUS, SODIUM BICARBONATE, SODIUM CHLORIDE, POTASSIUM CHLORIDE 227.1; 21.5; 6.36; 5.53; .754 G/L; G/L; G/L; G/L; G/L
227.1 POWDER, FOR SOLUTION ORAL
Qty: 1 | Refills: 0 | Status: DISCONTINUED | COMMUNITY
Start: 2019-12-30 | End: 2020-08-13

## 2020-08-13 NOTE — PHYSICAL EXAM
[Well Nourished] : well nourished [No Acute Distress] : no acute distress [Normal Outer Ear/Nose] : the outer ears and nose were normal in appearance [EOMI] : extraocular movements intact [Normal Sclera/Conjunctiva] : normal sclera/conjunctiva [No Accessory Muscle Use] : no accessory muscle use [No Respiratory Distress] : no respiratory distress  [Coordination Grossly Intact] : coordination grossly intact [Alert and Oriented x3] : oriented to person, place, and time [Normal Insight/Judgement] : insight and judgment were intact [de-identified] : depressed mood, anxious
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
## 2020-08-13 NOTE — COUNSELING
[Hazards of at-risk alcohol use discussed] : Hazards of at-risk alcohol use discussed [Quit Drinking] : Quit Drinking [Support options provided] : Support options provided [Strategies to reduce or eliminate alcohol use discussed] : Strategies to reduce or eliminate alcohol use discussed [Behavioral health counseling provided] : Behavioral health counseling provided [Plan in advance] : Plan in advance [Participate in Treatment Program] : Participate in treatment program [FreeTextEntry1] : 15

## 2020-08-13 NOTE — REVIEW OF SYSTEMS
[Nausea] : nausea [Heartburn] : heartburn [Vomiting] : vomiting [Depression] : depression [Anxiety] : anxiety [Negative] : Neurological [Diarrhea] : diarrhea [Abdominal Pain] : no abdominal pain

## 2020-08-13 NOTE — PLAN
[FreeTextEntry1] : discussed bottlecap, pt plans on going to women's group\par cont therapy f/u 2x/wk\par discussed risk of pancreatitis and gastric ulcer, suggested ER f/u for further eval\par pt refusing at this time due to son's upcoming engagement party\par refilling meds

## 2020-09-06 ENCOUNTER — RX RENEWAL (OUTPATIENT)
Age: 55
End: 2020-09-06

## 2020-10-14 ENCOUNTER — APPOINTMENT (OUTPATIENT)
Dept: FAMILY MEDICINE | Facility: CLINIC | Age: 55
End: 2020-10-14
Payer: MEDICAID

## 2020-10-14 DIAGNOSIS — R11.10 VOMITING, UNSPECIFIED: ICD-10-CM

## 2020-10-14 DIAGNOSIS — Z87.898 PERSONAL HISTORY OF OTHER SPECIFIED CONDITIONS: ICD-10-CM

## 2020-10-14 PROCEDURE — 99442: CPT

## 2020-10-14 RX ORDER — METHOCARBAMOL 500 MG/1
500 TABLET, FILM COATED ORAL
Qty: 14 | Refills: 0 | Status: DISCONTINUED | COMMUNITY
Start: 2019-11-21 | End: 2020-10-14

## 2020-10-14 NOTE — PHYSICAL EXAM
[Normal Affect] : the affect was normal [No Acute Distress] : no acute distress [Alert and Oriented x3] : oriented to person, place, and time [Normal Insight/Judgement] : insight and judgment were intact

## 2020-10-14 NOTE — HISTORY OF PRESENT ILLNESS
[Home] : at home, [unfilled] , at the time of the visit. [Medical Office: ([facility])___] : at the medical office located in  [Verbal consent obtained from patient] : the patient, [unfilled] [FreeTextEntry1] : states feeling a whole lot better, stopped drinking, attending virtual AA mtgs, trying to get her life back together again\par still having trouble sleeping through the night, seroquel does help, feels she needs a higher dose\par also c/o increased muscle cramps and spasm even off the seroquel, would like vitamin levels checked\par will come next wk

## 2020-10-21 ENCOUNTER — APPOINTMENT (OUTPATIENT)
Dept: FAMILY MEDICINE | Facility: CLINIC | Age: 55
End: 2020-10-21
Payer: MEDICAID

## 2020-10-21 VITALS — BODY MASS INDEX: 26.63 KG/M2 | WEIGHT: 160 LBS

## 2020-10-21 VITALS — SYSTOLIC BLOOD PRESSURE: 122 MMHG | DIASTOLIC BLOOD PRESSURE: 78 MMHG

## 2020-10-21 DIAGNOSIS — R63.0 ANOREXIA: ICD-10-CM

## 2020-10-21 LAB
BASOPHILS # BLD AUTO: 0.07 K/UL
BASOPHILS NFR BLD AUTO: 1.2 %
EOSINOPHIL # BLD AUTO: 0.13 K/UL
EOSINOPHIL NFR BLD AUTO: 2.2 %
HCT VFR BLD CALC: 38.9 %
HGB BLD-MCNC: 12.5 G/DL
IMM GRANULOCYTES NFR BLD AUTO: 0.2 %
LYMPHOCYTES # BLD AUTO: 2.1 K/UL
LYMPHOCYTES NFR BLD AUTO: 36.3 %
MAN DIFF?: NORMAL
MCHC RBC-ENTMCNC: 31.2 PG
MCHC RBC-ENTMCNC: 32.1 GM/DL
MCV RBC AUTO: 97 FL
MONOCYTES # BLD AUTO: 0.44 K/UL
MONOCYTES NFR BLD AUTO: 7.6 %
NEUTROPHILS # BLD AUTO: 3.04 K/UL
NEUTROPHILS NFR BLD AUTO: 52.5 %
PLATELET # BLD AUTO: 203 K/UL
RBC # BLD: 4.01 M/UL
RBC # FLD: 13.3 %
WBC # FLD AUTO: 5.79 K/UL

## 2020-10-21 PROCEDURE — 36415 COLL VENOUS BLD VENIPUNCTURE: CPT

## 2020-10-21 PROCEDURE — 99214 OFFICE O/P EST MOD 30 MIN: CPT | Mod: 25

## 2020-10-21 PROCEDURE — 99072 ADDL SUPL MATRL&STAF TM PHE: CPT

## 2020-10-21 NOTE — PHYSICAL EXAM
[No Acute Distress] : no acute distress [Well Nourished] : well nourished [Normal Sclera/Conjunctiva] : normal sclera/conjunctiva [EOMI] : extraocular movements intact [No Respiratory Distress] : no respiratory distress  [No Accessory Muscle Use] : no accessory muscle use [Coordination Grossly Intact] : coordination grossly intact [Normal Affect] : the affect was normal [Alert and Oriented x3] : oriented to person, place, and time [Normal Insight/Judgement] : insight and judgment were intact [Normal Outer Ear/Nose] : the outer ears and nose were normal in appearance [No JVD] : no jugular venous distention [Normal] : soft, non-tender, non-distended, no masses palpated, no HSM and normal bowel sounds [No Focal Deficits] : no focal deficits [Normal Gait] : normal gait [de-identified] : in good spirits

## 2020-10-21 NOTE — HISTORY OF PRESENT ILLNESS
[FreeTextEntry1] : c/o thigh cramps, muscle pain, and joint pain for a mth\par taking potassium and mg+, multivitamin, 3 or 4 advil\par speaks to therapist wkly [de-identified] : has not had the urge to drink, off disulfiram for now

## 2020-10-26 LAB
ALBUMIN SERPL ELPH-MCNC: 4.4 G/DL
ALP BLD-CCNC: 71 U/L
ALT SERPL-CCNC: 14 U/L
ANA PAT FLD IF-IMP: ABNORMAL
ANA PATTERN: ABNORMAL
ANA SER IF-ACNC: ABNORMAL
ANA TITER: ABNORMAL
ANION GAP SERPL CALC-SCNC: 12 MMOL/L
AST SERPL-CCNC: 20 U/L
B BURGDOR AB SER-IMP: NEGATIVE
B BURGDOR IGM PATRN SER IB-IMP: NEGATIVE
B BURGDOR18KD IGG SER QL IB: NORMAL
B BURGDOR23KD IGG SER QL IB: NORMAL
B BURGDOR23KD IGM SER QL IB: NORMAL
B BURGDOR28KD IGG SER QL IB: NORMAL
B BURGDOR30KD IGG SER QL IB: NORMAL
B BURGDOR31KD IGG SER QL IB: PRESENT
B BURGDOR39KD IGG SER QL IB: NORMAL
B BURGDOR39KD IGM SER QL IB: NORMAL
B BURGDOR41KD IGG SER QL IB: PRESENT
B BURGDOR41KD IGM SER QL IB: PRESENT
B BURGDOR45KD IGG SER QL IB: NORMAL
B BURGDOR58KD IGG SER QL IB: PRESENT
B BURGDOR66KD IGG SER QL IB: NORMAL
B BURGDOR93KD IGG SER QL IB: PRESENT
BILIRUB SERPL-MCNC: 0.2 MG/DL
BUN SERPL-MCNC: 11 MG/DL
CALCIUM SERPL-MCNC: 9.4 MG/DL
CHLORIDE SERPL-SCNC: 106 MMOL/L
CK SERPL-CCNC: 58 U/L
CO2 SERPL-SCNC: 23 MMOL/L
CREAT SERPL-MCNC: 0.73 MG/DL
CRP SERPL-MCNC: <0.1 MG/DL
EBV EA AB SER IA-ACNC: 29.3 U/ML
EBV EA AB TITR SER IF: POSITIVE
EBV EA IGG SER QL IA: 431 U/ML
EBV EA IGG SER-ACNC: POSITIVE
EBV EA IGM SER IA-ACNC: NEGATIVE
EBV PATRN SPEC IB-IMP: NORMAL
EBV VCA IGG SER IA-ACNC: 65 U/ML
EBV VCA IGM SER QL IA: <10 U/ML
EPSTEIN-BARR VIRUS CAPSID ANTIGEN IGG: POSITIVE
ERYTHROCYTE [SEDIMENTATION RATE] IN BLOOD BY WESTERGREN METHOD: 23 MM/HR
GLUCOSE SERPL-MCNC: 95 MG/DL
POTASSIUM SERPL-SCNC: 4 MMOL/L
PROT SERPL-MCNC: 6.6 G/DL
RHEUMATOID FACT SER QL: <10 IU/ML
SARS-COV-2 IGG SERPL IA-ACNC: <0.1 INDEX
SARS-COV-2 IGG SERPL QL IA: NEGATIVE
SODIUM SERPL-SCNC: 140 MMOL/L
TSH SERPL-ACNC: 2.49 UIU/ML
URATE SERPL-MCNC: 4.7 MG/DL

## 2020-11-04 ENCOUNTER — APPOINTMENT (OUTPATIENT)
Dept: FAMILY MEDICINE | Facility: CLINIC | Age: 55
End: 2020-11-04
Payer: MEDICAID

## 2020-11-04 VITALS
DIASTOLIC BLOOD PRESSURE: 70 MMHG | SYSTOLIC BLOOD PRESSURE: 112 MMHG | OXYGEN SATURATION: 98 % | BODY MASS INDEX: 26.66 KG/M2 | WEIGHT: 160 LBS | HEIGHT: 65 IN | HEART RATE: 84 BPM

## 2020-11-04 DIAGNOSIS — Z86.69 PERSONAL HISTORY OF OTHER DISEASES OF THE NERVOUS SYSTEM AND SENSE ORGANS: ICD-10-CM

## 2020-11-04 DIAGNOSIS — Z87.898 PERSONAL HISTORY OF OTHER SPECIFIED CONDITIONS: ICD-10-CM

## 2020-11-04 PROCEDURE — G0008: CPT

## 2020-11-04 PROCEDURE — 99072 ADDL SUPL MATRL&STAF TM PHE: CPT

## 2020-11-04 PROCEDURE — 99213 OFFICE O/P EST LOW 20 MIN: CPT | Mod: 25

## 2020-11-04 PROCEDURE — 90686 IIV4 VACC NO PRSV 0.5 ML IM: CPT

## 2020-11-04 NOTE — PHYSICAL EXAM
[No Acute Distress] : no acute distress [Well Nourished] : well nourished [Normal Sclera/Conjunctiva] : normal sclera/conjunctiva [EOMI] : extraocular movements intact [Normal Outer Ear/Nose] : the outer ears and nose were normal in appearance [No Respiratory Distress] : no respiratory distress  [No Accessory Muscle Use] : no accessory muscle use [Coordination Grossly Intact] : coordination grossly intact [Normal Affect] : the affect was normal [Alert and Oriented x3] : oriented to person, place, and time [Normal Insight/Judgement] : insight and judgment were intact

## 2020-11-04 NOTE — HISTORY OF PRESENT ILLNESS
[FreeTextEntry1] : feels well, still not drinking\par muscle aches/pains are subsiding, has rheum appt next mth, GI appt\par has gained wgt, pleading for phentermine, not happy

## 2020-11-04 NOTE — PLAN
[FreeTextEntry1] : will need to monitor bp and hr closely while taking phentermine\par enforced diet and exercise\par cont f/u w/ specialists

## 2020-11-19 ENCOUNTER — APPOINTMENT (OUTPATIENT)
Dept: INTERNAL MEDICINE | Facility: CLINIC | Age: 55
End: 2020-11-19
Payer: MEDICAID

## 2020-11-19 VITALS — SYSTOLIC BLOOD PRESSURE: 122 MMHG | DIASTOLIC BLOOD PRESSURE: 81 MMHG | HEART RATE: 106 BPM | HEIGHT: 65 IN

## 2020-11-19 PROCEDURE — 99213 OFFICE O/P EST LOW 20 MIN: CPT

## 2020-11-19 RX ORDER — ETODOLAC 400 MG/1
400 TABLET, FILM COATED ORAL TWICE DAILY
Qty: 40 | Refills: 0 | Status: DISCONTINUED | COMMUNITY
Start: 2020-11-04 | End: 2020-11-19

## 2020-11-19 RX ORDER — DISULFIRAM 500 MG/1
500 TABLET ORAL DAILY
Qty: 90 | Refills: 3 | Status: DISCONTINUED | COMMUNITY
Start: 2019-10-31 | End: 2020-11-19

## 2020-11-19 NOTE — HISTORY OF PRESENT ILLNESS
[FreeTextEntry1] : Saw Dr. Miranda- 1 month ago had vomiting. she was vomiting for less than 24 hours\par  Had EGD and colon oscopy 2/2020 polyp was hyperplastic\par  No further alcohol abuse

## 2020-11-19 NOTE — PHYSICAL EXAM
[General Appearance - Alert] : alert [General Appearance - In No Acute Distress] : in no acute distress [Sclera] : the sclera and conjunctiva were normal [PERRL With Normal Accommodation] : pupils were equal in size, round, and reactive to light [Extraocular Movements] : extraocular movements were intact [Outer Ear] : the ears and nose were normal in appearance [Oropharynx] : the oropharynx was normal [Neck Appearance] : the appearance of the neck was normal [Neck Cervical Mass (___cm)] : no neck mass was observed [Jugular Venous Distention Increased] : there was no jugular-venous distention [Thyroid Diffuse Enlargement] : the thyroid was not enlarged [Thyroid Nodule] : there were no palpable thyroid nodules [Auscultation Breath Sounds / Voice Sounds] : lungs were clear to auscultation bilaterally [Bowel Sounds] : normal bowel sounds [Abdomen Soft] : soft [Abdomen Tenderness] : non-tender [Abdomen Mass (___ Cm)] : no abdominal mass palpated [Cervical Lymph Nodes Enlarged Posterior Bilaterally] : posterior cervical [Cervical Lymph Nodes Enlarged Anterior Bilaterally] : anterior cervical [Supraclavicular Lymph Nodes Enlarged Bilaterally] : supraclavicular [Axillary Lymph Nodes Enlarged Bilaterally] : axillary [Femoral Lymph Nodes Enlarged Bilaterally] : femoral [Inguinal Lymph Nodes Enlarged Bilaterally] : inguinal [No CVA Tenderness] : no ~M costovertebral angle tenderness [No Spinal Tenderness] : no spinal tenderness [Skin Color & Pigmentation] : normal skin color and pigmentation [Skin Turgor] : normal skin turgor [] : no rash [Deep Tendon Reflexes (DTR)] : deep tendon reflexes were 2+ and symmetric [Sensation] : the sensory exam was normal to light touch and pinprick [No Focal Deficits] : no focal deficits [Oriented To Time, Place, And Person] : oriented to person, place, and time [Impaired Insight] : insight and judgment were intact [Affect] : the affect was normal

## 2020-12-07 ENCOUNTER — RX RENEWAL (OUTPATIENT)
Age: 55
End: 2020-12-07

## 2020-12-09 ENCOUNTER — APPOINTMENT (OUTPATIENT)
Dept: RHEUMATOLOGY | Facility: CLINIC | Age: 55
End: 2020-12-09
Payer: MEDICAID

## 2020-12-09 VITALS
HEIGHT: 65 IN | WEIGHT: 160 LBS | BODY MASS INDEX: 26.66 KG/M2 | HEART RATE: 90 BPM | DIASTOLIC BLOOD PRESSURE: 83 MMHG | SYSTOLIC BLOOD PRESSURE: 138 MMHG

## 2020-12-09 PROCEDURE — 99205 OFFICE O/P NEW HI 60 MIN: CPT

## 2020-12-09 PROCEDURE — 99072 ADDL SUPL MATRL&STAF TM PHE: CPT

## 2020-12-09 RX ORDER — LORATADINE 10 MG/1
10 TABLET ORAL
Qty: 30 | Refills: 0 | Status: COMPLETED | COMMUNITY
Start: 2019-05-13 | End: 2020-12-09

## 2020-12-09 RX ORDER — CYANOCOBALAMIN (VITAMIN B-12) 1000 MCG
1000 TABLET ORAL
Qty: 90 | Refills: 1 | Status: COMPLETED | COMMUNITY
Start: 2019-11-01 | End: 2020-12-09

## 2020-12-09 RX ORDER — TIZANIDINE 2 MG/1
2 TABLET ORAL
Qty: 22 | Refills: 1 | Status: COMPLETED | COMMUNITY
Start: 2020-11-04 | End: 2020-12-09

## 2020-12-09 RX ORDER — FERROUS GLUCONATE 324(38)MG
324 (38 FE) TABLET ORAL
Qty: 30 | Refills: 5 | Status: COMPLETED | COMMUNITY
Start: 2020-04-27 | End: 2020-12-09

## 2020-12-09 RX ORDER — TIZANIDINE 4 MG/1
4 TABLET ORAL
Qty: 30 | Refills: 0 | Status: COMPLETED | COMMUNITY
Start: 2020-10-14 | End: 2020-12-09

## 2020-12-09 RX ORDER — FERROUS GLUCONATE 324(37.5)
324 (37.5 FE) TABLET ORAL
Qty: 60 | Refills: 2 | Status: COMPLETED | COMMUNITY
Start: 2019-11-01 | End: 2020-12-09

## 2020-12-09 NOTE — CONSULT LETTER
[Dear  ___] : Dear  [unfilled], [Consult Letter:] : I had the pleasure of evaluating your patient, [unfilled]. [Please see my note below.] : Please see my note below. [Consult Closing:] : Thank you very much for allowing me to participate in the care of this patient.  If you have any questions, please do not hesitate to contact me. [Sincerely,] : Sincerely, [FreeTextEntry3] : Mikey Byrnes MD\par Rheumatology\par [facility]\par  of Medicine\par [facility] \par \par [address]\[place] [address]\par \par [address] [facility]\[place] [address]\par \par [address] [address]\par \par phone:  [phone]\par fax:      [phone]\par

## 2020-12-09 NOTE — HISTORY OF PRESENT ILLNESS
[Fatigue] : fatigue [Arthralgias] : arthralgias [Joint Swelling] : joint swelling [Morning Stiffness] : morning stiffness [FreeTextEntry1] : 55 year old female with PMHx as listed below reports that she has been experiencing diffuse joint pains for the past 3 months.  The pain occurs primarily with activity and improves at rest.  It's also worse upon getting up after prolonged inactivity.  She describes the pain as dull and tight, had been 10 out of 10, though has now improved to 5/10.  She also reports that she often wakes up with burning pain in her arms (R>L).  (+)intermittent swelling in her ankles.  (+)AM stiffness, usually lasting several minutes.  She gets some relief from clonazepam.  No other known alleviating factors.\par No F/C, no unintentional weight loss, no night sweats, no oral ulcers, no rashes, no alopecia, no photosensitivity, no dry eyes/dry mouth, no Raynaud symptoms, no focal weakness, no dysphagia  [Anorexia] : no anorexia [Weight Loss] : no weight loss [Malaise] : no malaise [Fever] : no fever [Chills] : no chills [Malar Facial Rash] : no malar facial rash [Skin Lesions] : no lesions [Skin Nodules] : no skin nodules [Oral Ulcers] : no oral ulcers [Cough] : no cough [Dry Mouth] : no dry mouth [Dysphonia] : no dysphonia [Dysphagia] : no dysphagia [Shortness of Breath] : no shortness of breath [Chest Pain] : no chest pain [Joint Warmth] : no joint warmth [Joint Deformity] : no joint deformity [Decreased ROM] : no decreased range of motion [Falls] : no falls [Dyspnea] : no dyspnea [Myalgias] : no myalgias [Muscle Weakness] : no muscle weakness [Muscle Spasms] : no muscle spasms [Muscle Cramping] : no muscle cramping [Visual Changes] : no visual changes [Eye Pain] : no eye pain [Eye Redness] : no eye redness [Dry Eyes] : no dry eyes

## 2020-12-09 NOTE — PHYSICAL EXAM
[General Appearance - Alert] : alert [General Appearance - In No Acute Distress] : in no acute distress [Sclera] : the sclera and conjunctiva were normal [Outer Ear] : the ears and nose were normal in appearance [Oropharynx] : the oropharynx was normal [Neck Appearance] : the appearance of the neck was normal [Neck Cervical Mass (___cm)] : no neck mass was observed [Jugular Venous Distention Increased] : there was no jugular-venous distention [Thyroid Diffuse Enlargement] : the thyroid was not enlarged [Thyroid Nodule] : there were no palpable thyroid nodules [Auscultation Breath Sounds / Voice Sounds] : lungs were clear to auscultation bilaterally [Heart Rate And Rhythm] : heart rate was normal and rhythm regular [Heart Sounds] : normal S1 and S2 [Heart Sounds Gallop] : no gallops [Murmurs] : no murmurs [Heart Sounds Pericardial Friction Rub] : no pericardial rub [Edema] : there was no peripheral edema [Bowel Sounds] : normal bowel sounds [Abdomen Soft] : soft [Abdomen Tenderness] : non-tender [Abdomen Mass (___ Cm)] : no abdominal mass palpated [Cervical Lymph Nodes Enlarged Posterior Bilaterally] : posterior cervical [Cervical Lymph Nodes Enlarged Anterior Bilaterally] : anterior cervical [Supraclavicular Lymph Nodes Enlarged Bilaterally] : supraclavicular [No Spinal Tenderness] : no spinal tenderness [Skin Color & Pigmentation] : normal skin color and pigmentation [Skin Turgor] : normal skin turgor [] : no rash [No Focal Deficits] : no focal deficits [Oriented To Time, Place, And Person] : oriented to person, place, and time [Impaired Insight] : insight and judgment were intact [Affect] : the affect was normal [FreeTextEntry1] : No synovitis, B/L shoulders w/ pain upon abduction and internal rotation, (+)impingement;  full ROM in rest of joints\par

## 2020-12-09 NOTE — ASSESSMENT
[FreeTextEntry1] : 55 year old female presents with polyarticular joint pains and was found to have a positive NICOLE.  The etiology of her joint pains is unclear.  While she c/o intermittent B/L ankle swelling, she does not exhibit any other obvious signs/symptoms of inflammatory arthritis.  She also c/o alopecia and dry mouth, which can be suggestive of a possible underlying connective tissue disorder, but they are very non-specific.  I have therefore ordered some more bloodwork, including further serologies, as further workup. She will follow up with me again in 2-3 weeks  to review her results. \par The pain in her shoulders/upper arms is most suggestive of rotator cuff tendonopathy and/or radiculopathic pain.  I have ordered some x-rays as further workup.  In the meantime, I have recommended conservative treatment, including exercises, ibuprofen prn, warm compresses, and topical analgesics.  If there is no improvement by her next visit, she will consider corticosteroid injections.

## 2020-12-10 LAB
ALBUMIN SERPL ELPH-MCNC: 4.8 G/DL
ALP BLD-CCNC: 79 U/L
ALT SERPL-CCNC: 45 U/L
ANION GAP SERPL CALC-SCNC: 16 MMOL/L
AST SERPL-CCNC: 73 U/L
BASOPHILS # BLD AUTO: 0.08 K/UL
BASOPHILS NFR BLD AUTO: 1.3 %
BILIRUB SERPL-MCNC: 0.2 MG/DL
BUN SERPL-MCNC: 10 MG/DL
C3 SERPL-MCNC: 109 MG/DL
C4 SERPL-MCNC: 19 MG/DL
CALCIUM SERPL-MCNC: 9.4 MG/DL
CHLORIDE SERPL-SCNC: 99 MMOL/L
CO2 SERPL-SCNC: 24 MMOL/L
CREAT SERPL-MCNC: 0.75 MG/DL
CRP SERPL-MCNC: 0.13 MG/DL
ENA RNP AB SER IA-ACNC: <0.2 AL
ENA SM AB SER IA-ACNC: <0.2 AL
ENA SS-A AB SER IA-ACNC: <0.2 AL
ENA SS-B AB SER IA-ACNC: <0.2 AL
EOSINOPHIL # BLD AUTO: 0.03 K/UL
EOSINOPHIL NFR BLD AUTO: 0.5 %
ERYTHROCYTE [SEDIMENTATION RATE] IN BLOOD BY WESTERGREN METHOD: 37 MM/HR
GLUCOSE SERPL-MCNC: 153 MG/DL
HCT VFR BLD CALC: 41.2 %
HGB BLD-MCNC: 13.2 G/DL
IMM GRANULOCYTES NFR BLD AUTO: 0.3 %
LYMPHOCYTES # BLD AUTO: 1.22 K/UL
LYMPHOCYTES NFR BLD AUTO: 19.8 %
MAN DIFF?: NORMAL
MCHC RBC-ENTMCNC: 30.7 PG
MCHC RBC-ENTMCNC: 32 GM/DL
MCV RBC AUTO: 95.8 FL
MONOCYTES # BLD AUTO: 0.34 K/UL
MONOCYTES NFR BLD AUTO: 5.5 %
NEUTROPHILS # BLD AUTO: 4.48 K/UL
NEUTROPHILS NFR BLD AUTO: 72.6 %
PLATELET # BLD AUTO: 264 K/UL
POTASSIUM SERPL-SCNC: 3.9 MMOL/L
PROT SERPL-MCNC: 7.5 G/DL
RBC # BLD: 4.3 M/UL
RBC # FLD: 15.2 %
SODIUM SERPL-SCNC: 139 MMOL/L
WBC # FLD AUTO: 6.17 K/UL

## 2020-12-11 LAB
ANA PAT FLD IF-IMP: ABNORMAL
ANA SER IF-ACNC: ABNORMAL
THYROGLOB AB SERPL-ACNC: <20 IU/ML
THYROPEROXIDASE AB SERPL IA-ACNC: 13.1 IU/ML

## 2020-12-14 ENCOUNTER — APPOINTMENT (OUTPATIENT)
Dept: FAMILY MEDICINE | Facility: CLINIC | Age: 55
End: 2020-12-14
Payer: MEDICAID

## 2020-12-14 VITALS
HEIGHT: 65 IN | SYSTOLIC BLOOD PRESSURE: 130 MMHG | HEART RATE: 100 BPM | BODY MASS INDEX: 26.66 KG/M2 | DIASTOLIC BLOOD PRESSURE: 75 MMHG | OXYGEN SATURATION: 96 % | WEIGHT: 160 LBS

## 2020-12-14 LAB
CCP AB SER IA-ACNC: 26 UNITS
DSDNA AB SER-ACNC: <12 IU/ML
RF+CCP IGG SER-IMP: ABNORMAL

## 2020-12-14 PROCEDURE — 99072 ADDL SUPL MATRL&STAF TM PHE: CPT

## 2020-12-14 PROCEDURE — 99214 OFFICE O/P EST MOD 30 MIN: CPT

## 2020-12-14 NOTE — REVIEW OF SYSTEMS
[Joint Pain] : joint pain [Insomnia] : insomnia [Negative] : Heme/Lymph [FreeTextEntry9] : chronic joint pain

## 2020-12-14 NOTE — ASSESSMENT
[FreeTextEntry1] : saul positive/joint pain\par following with  rheum, getting x rays\par \par obesity\par The diagnosis of obesity was discussed with the patient. The patient was counseled on diet and exercise. Patient was advised to eat a diet low in carbohydrates and low in fat with high protein diet with plenty of vegetables. Patient was counseled to eat small meals throughout the day avoiding carbohydrates, foods high in fat, foods that are fried and fast food.  Patient was advised to monitor fluid intake, to drink at least 8 glasses of pure water a day and reduce/stop intake of all sugar drinks including juice and soda. Patient was advised to try and exercise for 30-40 minutes per day for at least three days a week. Pros and cons of using medical management for weight loss versus diet/exercise alone was discussed. Medication options were provided for the patient and side affects and risks were identified and discussed.  Patient was advised to take any medications as prescribed and to call office or go to the ER immediately if any issues with the medications occur. All questions were answered.\par lost 6 pounds, feeling good\par \par anxiety\par Discussed diagnosis of anxiety and depression with the patient and potential outcomes/side affects of treatment versus non treatment. Medications were assessed and described at length. Side affects and black box warning were discussed.  Patient was advised to continue will all medications prescribed and the need for compliance was discussed and emphasized. Patient was advised to not stop medications without discussing with a health care provider first. Patient was advised to continue psychotherapy or seek therapy if not currently attending. Patient was educated on addictive potential of controlled substances and was counseled to use only as needed and sparingly. Patient verbalized understanding of all the above.\par uses ativan as needed\par

## 2020-12-14 NOTE — PHYSICAL EXAM
[Well Nourished] : well nourished [Well Developed] : well developed [Clear to Auscultation] : lungs were clear to auscultation bilaterally [Regular Rhythm] : with a regular rhythm [Normal S1, S2] : normal S1 and S2 [Normal Affect] : the affect was normal [Normal Insight/Judgement] : insight and judgment were intact [de-identified] : tenderness to coccyx

## 2020-12-14 NOTE — HISTORY OF PRESENT ILLNESS
[FreeTextEntry8] : 55 year old female is here for a followup visit about her labs and her medications. \par  Patient is here for medication renewals and for blood work discussion. Medications and allergies were reviewed and assessed.

## 2021-01-07 ENCOUNTER — RX RENEWAL (OUTPATIENT)
Age: 56
End: 2021-01-07

## 2021-01-20 ENCOUNTER — APPOINTMENT (OUTPATIENT)
Dept: FAMILY MEDICINE | Facility: CLINIC | Age: 56
End: 2021-01-20
Payer: MEDICAID

## 2021-01-20 VITALS
BODY MASS INDEX: 25.63 KG/M2 | SYSTOLIC BLOOD PRESSURE: 136 MMHG | WEIGHT: 154 LBS | OXYGEN SATURATION: 98 % | HEART RATE: 108 BPM | DIASTOLIC BLOOD PRESSURE: 80 MMHG

## 2021-01-20 VITALS — HEART RATE: 80 BPM

## 2021-01-20 DIAGNOSIS — Z86.79 PERSONAL HISTORY OF OTHER DISEASES OF THE CIRCULATORY SYSTEM: ICD-10-CM

## 2021-01-20 DIAGNOSIS — R04.0 EPISTAXIS: ICD-10-CM

## 2021-01-20 DIAGNOSIS — Z86.2 PERSONAL HISTORY OF DISEASES OF THE BLOOD AND BLOOD-FORMING ORGANS AND CERTAIN DISORDERS INVOLVING THE IMMUNE MECHANISM: ICD-10-CM

## 2021-01-20 PROCEDURE — 99213 OFFICE O/P EST LOW 20 MIN: CPT

## 2021-01-20 PROCEDURE — 99072 ADDL SUPL MATRL&STAF TM PHE: CPT

## 2021-01-20 NOTE — HISTORY OF PRESENT ILLNESS
[FreeTextEntry1] : here for med refills\par lost some wgt initially, wants increase in phentermine\par son just got over covid\par no longer drinking etoh

## 2021-01-20 NOTE — PLAN
[FreeTextEntry1] : refilled rx\par cont etoh abstinence\par allowed to vent, emotional support provided\par rtc in 1 mth

## 2021-01-20 NOTE — PHYSICAL EXAM
[No Acute Distress] : no acute distress [Well Nourished] : well nourished [Normal Sclera/Conjunctiva] : normal sclera/conjunctiva [EOMI] : extraocular movements intact [Normal Outer Ear/Nose] : the outer ears and nose were normal in appearance [No JVD] : no jugular venous distention [Normal] : normal rate, regular rhythm, normal S1 and S2 and no murmur heard [Coordination Grossly Intact] : coordination grossly intact [Normal Affect] : the affect was normal [Alert and Oriented x3] : oriented to person, place, and time [Normal Insight/Judgement] : insight and judgment were intact

## 2021-02-03 ENCOUNTER — APPOINTMENT (OUTPATIENT)
Dept: RHEUMATOLOGY | Facility: CLINIC | Age: 56
End: 2021-02-03
Payer: MEDICAID

## 2021-02-03 VITALS
TEMPERATURE: 97.8 F | SYSTOLIC BLOOD PRESSURE: 130 MMHG | WEIGHT: 154 LBS | DIASTOLIC BLOOD PRESSURE: 80 MMHG | HEART RATE: 80 BPM | BODY MASS INDEX: 25.66 KG/M2 | HEIGHT: 65 IN

## 2021-02-03 PROCEDURE — 99214 OFFICE O/P EST MOD 30 MIN: CPT

## 2021-02-03 PROCEDURE — 99072 ADDL SUPL MATRL&STAF TM PHE: CPT

## 2021-02-03 NOTE — PHYSICAL EXAM
[General Appearance - Alert] : alert [General Appearance - In No Acute Distress] : in no acute distress [Sclera] : the sclera and conjunctiva were normal [Outer Ear] : the ears and nose were normal in appearance [Oropharynx] : the oropharynx was normal [Neck Cervical Mass (___cm)] : no neck mass was observed [Neck Appearance] : the appearance of the neck was normal [Jugular Venous Distention Increased] : there was no jugular-venous distention [Thyroid Diffuse Enlargement] : the thyroid was not enlarged [Thyroid Nodule] : there were no palpable thyroid nodules [Auscultation Breath Sounds / Voice Sounds] : lungs were clear to auscultation bilaterally [Heart Rate And Rhythm] : heart rate was normal and rhythm regular [Heart Sounds] : normal S1 and S2 [Heart Sounds Gallop] : no gallops [Murmurs] : no murmurs [Heart Sounds Pericardial Friction Rub] : no pericardial rub [Edema] : there was no peripheral edema [Bowel Sounds] : normal bowel sounds [Abdomen Soft] : soft [Abdomen Tenderness] : non-tender [Abdomen Mass (___ Cm)] : no abdominal mass palpated [Cervical Lymph Nodes Enlarged Posterior Bilaterally] : posterior cervical [Cervical Lymph Nodes Enlarged Anterior Bilaterally] : anterior cervical [Supraclavicular Lymph Nodes Enlarged Bilaterally] : supraclavicular [No Spinal Tenderness] : no spinal tenderness [Skin Color & Pigmentation] : normal skin color and pigmentation [Skin Turgor] : normal skin turgor [] : no rash [No Focal Deficits] : no focal deficits [Oriented To Time, Place, And Person] : oriented to person, place, and time [Impaired Insight] : insight and judgment were intact [Affect] : the affect was normal [FreeTextEntry1] : No synovitis, B/L shoulders now w/ normal ROM without pain;  full ROM in rest of joints;  neck w/ normal ROM as well\par

## 2021-02-03 NOTE — HISTORY OF PRESENT ILLNESS
[Fatigue] : fatigue [Arthralgias] : arthralgias [Joint Swelling] : joint swelling [Morning Stiffness] : morning stiffness [FreeTextEntry1] : Feeling better overall since last visit - joint pains have improved, though she currently c/o worse "achiness" in her neck and shoulders since shoveling snow 2 days ago.  No significant joint swelling, though she now c/o AM stiffness x 30 minutes.  No other new complaints. [Anorexia] : no anorexia [Weight Loss] : no weight loss [Malaise] : no malaise [Fever] : no fever [Chills] : no chills [Malar Facial Rash] : no malar facial rash [Skin Lesions] : no lesions [Skin Nodules] : no skin nodules [Oral Ulcers] : no oral ulcers [Cough] : no cough [Dry Mouth] : no dry mouth [Dysphonia] : no dysphonia [Dysphagia] : no dysphagia [Shortness of Breath] : no shortness of breath [Chest Pain] : no chest pain [Joint Warmth] : no joint warmth [Joint Deformity] : no joint deformity [Decreased ROM] : no decreased range of motion [Falls] : no falls [Dyspnea] : no dyspnea [Myalgias] : no myalgias [Muscle Weakness] : no muscle weakness [Muscle Spasms] : no muscle spasms [Muscle Cramping] : no muscle cramping [Visual Changes] : no visual changes [Eye Pain] : no eye pain [Eye Redness] : no eye redness [Dry Eyes] : no dry eyes

## 2021-02-03 NOTE — ASSESSMENT
[FreeTextEntry1] : 55 year old female with:\par 1)  Polyarticular joint pains:  overall presentation most c/w non-inflammatory arthritis, though pt w/ prolonged AM stiffness and was found to have a weakly positive CCP and elevated ESR (though normal CRP).\par   - Trial of prednisone 10mg daily x 1 week.  Pt to then call to report if any improvement.  \par 2)  Neck pain:  x-rays revealed OA/DDD\par   - Reiterated importance of exercise\par   - ibuprofen and/or Tylenol prn\par   - warm compresses\par   - OTC topical analgesics\par 3) B/L shoulder pain:  most c/w RTC tendonopathy.  currently much improved\par   - Cont shoulder exercises\par   - analgesia as above\par 4)  Low back pain:  no red flag symptoms\par   - Back exercises\par   - analgesia as above\par   - If no improvement by next visit, will plan to order imaging\par 5)  (+)NICOLE:  no obvious si/sx of underlying CTD and all sub-serologies negative\par   - Cont to monitor.

## 2021-02-12 ENCOUNTER — RX RENEWAL (OUTPATIENT)
Age: 56
End: 2021-02-12

## 2021-02-18 ENCOUNTER — TRANSCRIPTION ENCOUNTER (OUTPATIENT)
Age: 56
End: 2021-02-18

## 2021-03-08 ENCOUNTER — NON-APPOINTMENT (OUTPATIENT)
Age: 56
End: 2021-03-08

## 2021-04-07 ENCOUNTER — NON-APPOINTMENT (OUTPATIENT)
Age: 56
End: 2021-04-07

## 2021-04-07 ENCOUNTER — APPOINTMENT (OUTPATIENT)
Dept: FAMILY MEDICINE | Facility: CLINIC | Age: 56
End: 2021-04-07
Payer: MEDICAID

## 2021-04-07 DIAGNOSIS — R52 PAIN, UNSPECIFIED: ICD-10-CM

## 2021-04-07 DIAGNOSIS — T78.40XA ALLERGY, UNSPECIFIED, INITIAL ENCOUNTER: ICD-10-CM

## 2021-04-07 PROCEDURE — 99213 OFFICE O/P EST LOW 20 MIN: CPT | Mod: 95

## 2021-04-07 RX ORDER — MULTIVIT-MIN/FOLIC/VIT K/LYCOP 400-300MCG
1000 TABLET ORAL DAILY
Qty: 60 | Refills: 3 | Status: ACTIVE | COMMUNITY
Start: 2021-04-07 | End: 1900-01-01

## 2021-04-07 RX ORDER — CLONAZEPAM 1 MG/1
1 TABLET ORAL
Qty: 40 | Refills: 0 | Status: DISCONTINUED | COMMUNITY
Start: 2019-11-27 | End: 2021-04-07

## 2021-04-07 RX ORDER — HYDROCHLOROTHIAZIDE 12.5 MG/1
12.5 CAPSULE ORAL DAILY
Qty: 30 | Refills: 0 | Status: DISCONTINUED | COMMUNITY
Start: 2019-07-29 | End: 2021-04-07

## 2021-04-07 RX ORDER — PHENTERMINE HYDROCHLORIDE 15 MG/1
15 CAPSULE ORAL
Qty: 30 | Refills: 0 | Status: DISCONTINUED | COMMUNITY
Start: 2020-11-04 | End: 2021-04-07

## 2021-04-07 RX ORDER — PREDNISONE 10 MG/1
10 TABLET ORAL
Qty: 7 | Refills: 0 | Status: DISCONTINUED | COMMUNITY
Start: 2021-02-03 | End: 2021-04-07

## 2021-04-07 NOTE — HISTORY OF PRESENT ILLNESS
[FreeTextEntry1] : trembling w/ chills after getting pfizer vaccine approx 2 1/2 wks ago, afraid to get 2nd, feels it may have something to do w/ drinking etoh again\par stopped talking to therapist approx 6 wks ago, does not want to call her, just remembers hanging up on her\par has not spoken to family about drinking again, feels like they know already\par not ready to start any ETOH deterrants or participate in BottleCaps

## 2021-04-07 NOTE — PLAN
[FreeTextEntry1] : deferring bottlecap and vivitrol, pt not ready\par Allowed to vent, emotional support provided\par Educated on long term risks of continued alcoholism particularly continuous strain of family relationships\par Proceed w/ 2nd covid vx, hydrate, vit c, claritin, buspar, reduce etoh\par f/u when ready

## 2021-04-07 NOTE — PHYSICAL EXAM
[No Acute Distress] : no acute distress [Well Nourished] : well nourished [Normal Sclera/Conjunctiva] : normal sclera/conjunctiva [EOMI] : extraocular movements intact [Normal Outer Ear/Nose] : the outer ears and nose were normal in appearance [No Respiratory Distress] : no respiratory distress  [No Accessory Muscle Use] : no accessory muscle use [Coordination Grossly Intact] : coordination grossly intact [No Focal Deficits] : no focal deficits [Normal Affect] : the affect was normal [Alert and Oriented x3] : oriented to person, place, and time [Normal Insight/Judgement] : insight and judgment were intact

## 2021-04-15 ENCOUNTER — RX RENEWAL (OUTPATIENT)
Age: 56
End: 2021-04-15

## 2021-05-05 ENCOUNTER — APPOINTMENT (OUTPATIENT)
Dept: RHEUMATOLOGY | Facility: CLINIC | Age: 56
End: 2021-05-05
Payer: MEDICAID

## 2021-05-05 VITALS
BODY MASS INDEX: 25.66 KG/M2 | DIASTOLIC BLOOD PRESSURE: 77 MMHG | HEIGHT: 65 IN | HEART RATE: 73 BPM | WEIGHT: 154 LBS | SYSTOLIC BLOOD PRESSURE: 115 MMHG | OXYGEN SATURATION: 97 %

## 2021-05-05 PROCEDURE — 99072 ADDL SUPL MATRL&STAF TM PHE: CPT

## 2021-05-05 PROCEDURE — 20610 DRAIN/INJ JOINT/BURSA W/O US: CPT | Mod: RT

## 2021-05-05 PROCEDURE — 99214 OFFICE O/P EST MOD 30 MIN: CPT | Mod: 25

## 2021-05-05 RX ORDER — METHYLPRED ACET/NACL,ISO-OS/PF 40 MG/ML
40 VIAL (ML) INJECTION
Qty: 1 | Refills: 0 | Status: COMPLETED | OUTPATIENT
Start: 2021-05-05

## 2021-05-05 RX ADMIN — METHYLPREDNISOLONE ACETATE MG/ML: 40 INJECTION, SUSPENSION INTRA-ARTICULAR; INTRALESIONAL; INTRAMUSCULAR; SOFT TISSUE at 00:00

## 2021-05-05 NOTE — PROCEDURE
[Today's Date:] : Date: [unfilled] [Arthrocentesis] : arthrocentesis was performed [Patient] : the patient [Risks] : risks [Benefits] : benefits [Alternatives] : alternatives [Consent Obtained] : written consent was obtained prior to the procedure and is detailed in the patient's record [Therapeutic] : therapeutic [#1 Site: ______] : #1 site identified in the [unfilled] [Ethyl Chloride] : ethyl chloride [___ ml Inj] : [unfilled] ~Uml [1%] : 1%  [Without Epi] : without epinephrine [Betadine] : betadine solution [22 gauge 1.5 inch] : A 22 gauge 1.5 inch needle was used [Depomedrol ___ mg] : Depomedrol [unfilled] mg [Tolerated Well] : the patient tolerated the procedure well [Reports Improvement in Symptoms] : reports improvement in symptoms [No Complications] : there were no complications [Instructions Given] : handouts/patient instructions were given to patient [Patient Instructed to Call] : patient was instructed to call if redness at site, a decrease in range of motion or an increase in pain is noted after procedure.

## 2021-05-24 ENCOUNTER — LABORATORY RESULT (OUTPATIENT)
Age: 56
End: 2021-05-24

## 2021-05-24 ENCOUNTER — NON-APPOINTMENT (OUTPATIENT)
Age: 56
End: 2021-05-24

## 2021-05-24 ENCOUNTER — APPOINTMENT (OUTPATIENT)
Dept: FAMILY MEDICINE | Facility: CLINIC | Age: 56
End: 2021-05-24
Payer: MEDICAID

## 2021-05-24 VITALS
TEMPERATURE: 97.7 F | HEIGHT: 65 IN | WEIGHT: 154 LBS | HEART RATE: 87 BPM | DIASTOLIC BLOOD PRESSURE: 82 MMHG | SYSTOLIC BLOOD PRESSURE: 130 MMHG | OXYGEN SATURATION: 98 % | BODY MASS INDEX: 25.66 KG/M2

## 2021-05-24 VITALS — DIASTOLIC BLOOD PRESSURE: 90 MMHG | SYSTOLIC BLOOD PRESSURE: 140 MMHG

## 2021-05-24 DIAGNOSIS — G47.00 INSOMNIA, UNSPECIFIED: ICD-10-CM

## 2021-05-24 DIAGNOSIS — T50.B95A ADVERSE EFFECT OF OTHER VIRAL VACCINES, INITIAL ENCOUNTER: ICD-10-CM

## 2021-05-24 DIAGNOSIS — Z82.5 FAMILY HISTORY OF ASTHMA AND OTHER CHRONIC LOWER RESPIRATORY DISEASES: ICD-10-CM

## 2021-05-24 DIAGNOSIS — Z11.59 ENCOUNTER FOR SCREENING FOR OTHER VIRAL DISEASES: ICD-10-CM

## 2021-05-24 DIAGNOSIS — Z92.29 PERSONAL HISTORY OF OTHER DRUG THERAPY: ICD-10-CM

## 2021-05-24 DIAGNOSIS — M54.31 SCIATICA, RIGHT SIDE: ICD-10-CM

## 2021-05-24 LAB
BASOPHILS # BLD AUTO: 0.04 K/UL
BASOPHILS NFR BLD AUTO: 0.6 %
EOSINOPHIL # BLD AUTO: 0.06 K/UL
EOSINOPHIL NFR BLD AUTO: 0.8 %
HCT VFR BLD CALC: 41.8 %
HGB BLD-MCNC: 13.4 G/DL
IMM GRANULOCYTES NFR BLD AUTO: 0.3 %
LYMPHOCYTES # BLD AUTO: 1.79 K/UL
LYMPHOCYTES NFR BLD AUTO: 25.2 %
MAN DIFF?: NORMAL
MCHC RBC-ENTMCNC: 32.1 GM/DL
MCHC RBC-ENTMCNC: 32.2 PG
MCV RBC AUTO: 100.5 FL
MONOCYTES # BLD AUTO: 0.36 K/UL
MONOCYTES NFR BLD AUTO: 5.1 %
NEUTROPHILS # BLD AUTO: 4.83 K/UL
NEUTROPHILS NFR BLD AUTO: 68 %
PLATELET # BLD AUTO: 203 K/UL
RBC # BLD: 4.16 M/UL
RBC # FLD: 13.8 %
WBC # FLD AUTO: 7.1 K/UL

## 2021-05-24 PROCEDURE — 93000 ELECTROCARDIOGRAM COMPLETE: CPT | Mod: 59

## 2021-05-24 PROCEDURE — 99396 PREV VISIT EST AGE 40-64: CPT | Mod: 25

## 2021-05-24 NOTE — HEALTH RISK ASSESSMENT
[Fair] :  ~his/her~ mood as fair [Yes] : Yes [No] : In the past 12 months have you used drugs other than those required for medical reasons? No [Alone] : lives alone [Unemployed] : unemployed [Single] : single [Feels Safe at Home] : Feels safe at home [Fully functional (bathing, dressing, toileting, transferring, walking, feeding)] : Fully functional (bathing, dressing, toileting, transferring, walking, feeding) [Fully functional (using the telephone, shopping, preparing meals, housekeeping, doing laundry, using] : Fully functional and needs no help or supervision to perform IADLs (using the telephone, shopping, preparing meals, housekeeping, doing laundry, using transportation, managing medications and managing finances) [Seat Belt] :  uses seat belt [] : No [de-identified] : last drink 2 wks ago, 2 glasses, then a few wks prior [de-identified] : yoga, spinning bike [de-identified] : no restrictions [Change in mental status noted] : No change in mental status noted [Language] : denies difficulty with language [Handling Complex Tasks] : denies difficulty handling complex tasks [Sexually Active] : not sexually active [Reports changes in hearing] : Reports no changes in hearing [Reports changes in vision] : Reports no changes in vision [Reports changes in dental health] : Reports no changes in dental health [Travel to Developing Areas] : does not  travel to developing areas [MammogramDate] : 02/19 [ColonoscopyDate] : 02/20 [de-identified] : needs fluoride txt, grinding

## 2021-05-24 NOTE — PHYSICAL EXAM
[No Acute Distress] : no acute distress [Well Nourished] : well nourished [Well Developed] : well developed [Well-Appearing] : well-appearing [Normal Sclera/Conjunctiva] : normal sclera/conjunctiva [PERRL] : pupils equal round and reactive to light [EOMI] : extraocular movements intact [Normal Outer Ear/Nose] : the outer ears and nose were normal in appearance [Normal TMs] : both tympanic membranes were normal [No JVD] : no jugular venous distention [No Lymphadenopathy] : no lymphadenopathy [Supple] : supple [Thyroid Normal, No Nodules] : the thyroid was normal and there were no nodules present [No Respiratory Distress] : no respiratory distress  [No Accessory Muscle Use] : no accessory muscle use [Clear to Auscultation] : lungs were clear to auscultation bilaterally [Normal Rate] : normal rate  [Regular Rhythm] : with a regular rhythm [Normal S1, S2] : normal S1 and S2 [No Murmur] : no murmur heard [No Abdominal Bruit] : a ~M bruit was not heard ~T in the abdomen [No Varicosities] : no varicosities [Pedal Pulses Present] : the pedal pulses are present [No Edema] : there was no peripheral edema [No Palpable Aorta] : no palpable aorta [No Extremity Clubbing/Cyanosis] : no extremity clubbing/cyanosis [Normal Appearance] : normal in appearance [No Nipple Discharge] : no nipple discharge [No Axillary Lymphadenopathy] : no axillary lymphadenopathy [Soft] : abdomen soft [Non Tender] : non-tender [Non-distended] : non-distended [No Masses] : no abdominal mass palpated [No HSM] : no HSM [Normal Bowel Sounds] : normal bowel sounds [Normal Axillary Nodes] : no axillary lymphadenopathy [Normal Posterior Cervical Nodes] : no posterior cervical lymphadenopathy [Normal Anterior Cervical Nodes] : no anterior cervical lymphadenopathy [No CVA Tenderness] : no CVA  tenderness [No Spinal Tenderness] : no spinal tenderness [No Joint Swelling] : no joint swelling [Grossly Normal Strength/Tone] : grossly normal strength/tone [No Rash] : no rash [Coordination Grossly Intact] : coordination grossly intact [No Focal Deficits] : no focal deficits [Normal Gait] : normal gait [Normal Affect] : the affect was normal [Alert and Oriented x3] : oriented to person, place, and time [Normal Insight/Judgement] : insight and judgment were intact [Kyphosis] : no kyphosis

## 2021-05-24 NOTE — HISTORY OF PRESENT ILLNESS
[FreeTextEntry1] : 55 year old female who presents today for a complete physical exam.\par c/o trouble sleeping despite meds\par follows w/ rheum regularly\par drinking less, last drink 2 wks ago\par would like phentermine refill, son getting  on labor day\par doing yoga wkly

## 2021-05-26 LAB
25(OH)D3 SERPL-MCNC: 37.8 NG/ML
ALBUMIN SERPL ELPH-MCNC: 4.9 G/DL
ALP BLD-CCNC: 78 U/L
ALT SERPL-CCNC: 30 U/L
ANION GAP SERPL CALC-SCNC: 18 MMOL/L
APPEARANCE: ABNORMAL
AST SERPL-CCNC: 38 U/L
BILIRUB SERPL-MCNC: 0.4 MG/DL
BILIRUBIN URINE: NEGATIVE
BLOOD URINE: NEGATIVE
BUN SERPL-MCNC: 19 MG/DL
C TRACH RRNA SPEC QL NAA+PROBE: NOT DETECTED
CALCIUM SERPL-MCNC: 10.1 MG/DL
CHLORIDE SERPL-SCNC: 100 MMOL/L
CHOLEST SERPL-MCNC: 266 MG/DL
CO2 SERPL-SCNC: 21 MMOL/L
COLOR: ABNORMAL
CREAT SERPL-MCNC: 0.61 MG/DL
GLUCOSE QUALITATIVE U: NEGATIVE
GLUCOSE SERPL-MCNC: 78 MG/DL
HBV SURFACE AG SER QL: NONREACTIVE
HCV AB SER QL: NONREACTIVE
HCV S/CO RATIO: 0.14 S/CO
HDLC SERPL-MCNC: 124 MG/DL
HSV 1+2 IGG SER IA-IMP: POSITIVE
HSV 1+2 IGG SER IA-IMP: POSITIVE
HSV1 IGG SER QL: 31.1 INDEX
HSV2 IGG SER QL: 1.96 INDEX
KETONES URINE: NORMAL
LDLC SERPL CALC-MCNC: 121 MG/DL
LEUKOCYTE ESTERASE URINE: NEGATIVE
N GONORRHOEA RRNA SPEC QL NAA+PROBE: NOT DETECTED
NITRITE URINE: NEGATIVE
NONHDLC SERPL-MCNC: 143 MG/DL
PH URINE: 5.5
POTASSIUM SERPL-SCNC: 3.9 MMOL/L
PROT SERPL-MCNC: 7.7 G/DL
PROTEIN URINE: ABNORMAL
SODIUM SERPL-SCNC: 139 MMOL/L
SOURCE AMPLIFICATION: NORMAL
SPECIFIC GRAVITY URINE: 1.03
T PALLIDUM AB SER QL IA: NEGATIVE
TRIGL SERPL-MCNC: 110 MG/DL
TSH SERPL-ACNC: 1.38 UIU/ML
UROBILINOGEN URINE: NORMAL

## 2021-05-27 ENCOUNTER — APPOINTMENT (OUTPATIENT)
Dept: FAMILY MEDICINE | Facility: CLINIC | Age: 56
End: 2021-05-27
Payer: MEDICAID

## 2021-05-27 VITALS
DIASTOLIC BLOOD PRESSURE: 100 MMHG | HEIGHT: 65 IN | TEMPERATURE: 97.5 F | OXYGEN SATURATION: 98 % | BODY MASS INDEX: 23.99 KG/M2 | RESPIRATION RATE: 18 BRPM | SYSTOLIC BLOOD PRESSURE: 144 MMHG | HEART RATE: 130 BPM | WEIGHT: 144 LBS

## 2021-05-27 DIAGNOSIS — Z12.11 ENCOUNTER FOR SCREENING FOR MALIGNANT NEOPLASM OF COLON: ICD-10-CM

## 2021-05-27 DIAGNOSIS — Z11.3 ENCOUNTER FOR SCREENING FOR INFECTIONS WITH A PREDOMINANTLY SEXUAL MODE OF TRANSMISSION: ICD-10-CM

## 2021-05-27 DIAGNOSIS — E87.6 HYPOKALEMIA: ICD-10-CM

## 2021-05-27 DIAGNOSIS — Z12.12 ENCOUNTER FOR SCREENING FOR MALIGNANT NEOPLASM OF COLON: ICD-10-CM

## 2021-05-27 DIAGNOSIS — B00.9 HERPESVIRAL INFECTION, UNSPECIFIED: ICD-10-CM

## 2021-05-27 DIAGNOSIS — E53.8 DEFICIENCY OF OTHER SPECIFIED B GROUP VITAMINS: ICD-10-CM

## 2021-05-27 LAB
ALBUMIN SERPL ELPH-MCNC: 5.5 G/DL
ALP BLD-CCNC: 79 U/L
ALT SERPL-CCNC: 30 U/L
AMYLASE/CREAT SERPL: 67 U/L
ANION GAP SERPL CALC-SCNC: 34 MMOL/L
AST SERPL-CCNC: 34 U/L
BASOPHILS # BLD AUTO: 0.03 K/UL
BASOPHILS NFR BLD AUTO: 0.3 %
BILIRUB SERPL-MCNC: 1.3 MG/DL
BUN SERPL-MCNC: 17 MG/DL
CALCIUM SERPL-MCNC: 10.7 MG/DL
CHLORIDE SERPL-SCNC: 91 MMOL/L
CO2 SERPL-SCNC: 15 MMOL/L
CREAT SERPL-MCNC: 1.03 MG/DL
EOSINOPHIL # BLD AUTO: 0 K/UL
EOSINOPHIL NFR BLD AUTO: 0 %
GLUCOSE SERPL-MCNC: 200 MG/DL
HCT VFR BLD CALC: 45.7 %
HGB BLD-MCNC: 14.6 G/DL
HIV1+2 AB SPEC QL IA.RAPID: NONREACTIVE
IMM GRANULOCYTES NFR BLD AUTO: 0.6 %
LPL SERPL-CCNC: 21 U/L
LYMPHOCYTES # BLD AUTO: 0.49 K/UL
LYMPHOCYTES NFR BLD AUTO: 4.7 %
MAN DIFF?: NORMAL
MCHC RBC-ENTMCNC: 31.9 GM/DL
MCHC RBC-ENTMCNC: 32.1 PG
MCV RBC AUTO: 100.4 FL
MONOCYTES # BLD AUTO: 0.6 K/UL
MONOCYTES NFR BLD AUTO: 5.8 %
NEUTROPHILS # BLD AUTO: 9.18 K/UL
NEUTROPHILS NFR BLD AUTO: 88.6 %
PLATELET # BLD AUTO: 292 K/UL
POTASSIUM SERPL-SCNC: 4.1 MMOL/L
PROT SERPL-MCNC: 8.8 G/DL
RBC # BLD: 4.55 M/UL
RBC # FLD: 14.4 %
SODIUM SERPL-SCNC: 141 MMOL/L
WBC # FLD AUTO: 10.36 K/UL

## 2021-05-27 PROCEDURE — 99215 OFFICE O/P EST HI 40 MIN: CPT | Mod: 25

## 2021-05-27 RX ORDER — TRIMETHOBENZAMIDE HYDROCHLORIDE 100 MG/ML
100 INJECTION INTRAMUSCULAR
Qty: 0 | Refills: 0 | Status: COMPLETED | OUTPATIENT
Start: 2021-05-27

## 2021-05-27 RX ADMIN — TRIMETHOBENZAMIDE HYDROCHLORIDE 2 MG/ML: 100 INJECTION INTRAMUSCULAR at 00:00

## 2021-05-27 NOTE — PHYSICAL EXAM
[Ill-Appearing] : ill-appearing [No JVD] : no jugular venous distention [No Respiratory Distress] : no respiratory distress  [No Accessory Muscle Use] : no accessory muscle use [Normal] : no respiratory distress, lungs were clear to auscultation bilaterally and no accessory muscle use [Coordination Grossly Intact] : coordination grossly intact [No Focal Deficits] : no focal deficits [Alert and Oriented x3] : oriented to person, place, and time [de-identified] : vomiting [de-identified] : tachycardic [de-identified] : anxious

## 2021-05-27 NOTE — HISTORY OF PRESENT ILLNESS
[Parent] : parent [FreeTextEntry1] : here w/ mom; actively vomiting\par c/o vomiting all day and night after she started drinking, feeling very anxious, can't keep anything down since she started drinking again after her physical monday night; went to buy a bottle of vodka\par also wants to talk about hsv dx

## 2021-05-27 NOTE — PLAN
[FreeTextEntry1] : pt refused ER\par Provided # for [facility]\par chk bw\par tigan\par restart buspar

## 2021-05-27 NOTE — HEALTH RISK ASSESSMENT
[Yes] : Yes [4 or more  times a week (4 pts)] : 4 or more  times a week (4 points) [5 or 6 (2 pts)] : 5 or 6 (2  points)

## 2021-05-28 ENCOUNTER — EMERGENCY (EMERGENCY)
Facility: HOSPITAL | Age: 56
LOS: 0 days | Discharge: HOME | End: 2021-05-29
Attending: EMERGENCY MEDICINE
Payer: MEDICAID

## 2021-05-28 VITALS
OXYGEN SATURATION: 97 % | HEART RATE: 98 BPM | RESPIRATION RATE: 20 BRPM | SYSTOLIC BLOOD PRESSURE: 139 MMHG | WEIGHT: 143.96 LBS | DIASTOLIC BLOOD PRESSURE: 76 MMHG | HEIGHT: 65 IN | TEMPERATURE: 99 F

## 2021-05-28 DIAGNOSIS — K21.9 GASTRO-ESOPHAGEAL REFLUX DISEASE WITHOUT ESOPHAGITIS: ICD-10-CM

## 2021-05-28 DIAGNOSIS — R11.2 NAUSEA WITH VOMITING, UNSPECIFIED: ICD-10-CM

## 2021-05-28 DIAGNOSIS — R25.1 TREMOR, UNSPECIFIED: ICD-10-CM

## 2021-05-28 DIAGNOSIS — R63.4 ABNORMAL WEIGHT LOSS: ICD-10-CM

## 2021-05-28 DIAGNOSIS — F10.10 ALCOHOL ABUSE, UNCOMPLICATED: ICD-10-CM

## 2021-05-28 DIAGNOSIS — E86.0 DEHYDRATION: ICD-10-CM

## 2021-05-28 DIAGNOSIS — Z88.1 ALLERGY STATUS TO OTHER ANTIBIOTIC AGENTS STATUS: ICD-10-CM

## 2021-05-28 LAB
ACETONE SERPL-MCNC: NEGATIVE — SIGNIFICANT CHANGE UP
ALBUMIN SERPL ELPH-MCNC: 4 G/DL — SIGNIFICANT CHANGE UP (ref 3.3–5)
ALP SERPL-CCNC: 57 U/L — SIGNIFICANT CHANGE UP (ref 40–120)
ALT FLD-CCNC: 30 U/L — SIGNIFICANT CHANGE UP (ref 12–78)
ANION GAP SERPL CALC-SCNC: 10 MMOL/L — SIGNIFICANT CHANGE UP (ref 5–17)
AST SERPL-CCNC: 30 U/L — SIGNIFICANT CHANGE UP (ref 15–37)
BASOPHILS # BLD AUTO: 0.04 K/UL — SIGNIFICANT CHANGE UP (ref 0–0.2)
BASOPHILS NFR BLD AUTO: 0.5 % — SIGNIFICANT CHANGE UP (ref 0–2)
BILIRUB SERPL-MCNC: 1.2 MG/DL — SIGNIFICANT CHANGE UP (ref 0.2–1.2)
BUN SERPL-MCNC: 17 MG/DL — SIGNIFICANT CHANGE UP (ref 7–23)
CALCIUM SERPL-MCNC: 8.9 MG/DL — SIGNIFICANT CHANGE UP (ref 8.5–10.1)
CHLORIDE SERPL-SCNC: 97 MMOL/L — SIGNIFICANT CHANGE UP (ref 96–108)
CO2 SERPL-SCNC: 28 MMOL/L — SIGNIFICANT CHANGE UP (ref 22–31)
CREAT SERPL-MCNC: 0.97 MG/DL — SIGNIFICANT CHANGE UP (ref 0.5–1.3)
EOSINOPHIL # BLD AUTO: 0.01 K/UL — SIGNIFICANT CHANGE UP (ref 0–0.5)
EOSINOPHIL NFR BLD AUTO: 0.1 % — SIGNIFICANT CHANGE UP (ref 0–6)
GLUCOSE SERPL-MCNC: 102 MG/DL — HIGH (ref 70–99)
HCT VFR BLD CALC: 41.9 % — SIGNIFICANT CHANGE UP (ref 34.5–45)
HGB BLD-MCNC: 14.3 G/DL — SIGNIFICANT CHANGE UP (ref 11.5–15.5)
IMM GRANULOCYTES NFR BLD AUTO: 0.2 % — SIGNIFICANT CHANGE UP (ref 0–1.5)
LYMPHOCYTES # BLD AUTO: 2.33 K/UL — SIGNIFICANT CHANGE UP (ref 1–3.3)
LYMPHOCYTES # BLD AUTO: 26.4 % — SIGNIFICANT CHANGE UP (ref 13–44)
MAGNESIUM SERPL-MCNC: 1.4 MG/DL — LOW (ref 1.6–2.6)
MCHC RBC-ENTMCNC: 32.4 PG — SIGNIFICANT CHANGE UP (ref 27–34)
MCHC RBC-ENTMCNC: 34.1 GM/DL — SIGNIFICANT CHANGE UP (ref 32–36)
MCV RBC AUTO: 95 FL — SIGNIFICANT CHANGE UP (ref 80–100)
MONOCYTES # BLD AUTO: 0.71 K/UL — SIGNIFICANT CHANGE UP (ref 0–0.9)
MONOCYTES NFR BLD AUTO: 8.1 % — SIGNIFICANT CHANGE UP (ref 2–14)
NEUTROPHILS # BLD AUTO: 5.7 K/UL — SIGNIFICANT CHANGE UP (ref 1.8–7.4)
NEUTROPHILS NFR BLD AUTO: 64.7 % — SIGNIFICANT CHANGE UP (ref 43–77)
NRBC # BLD: 0 /100 WBCS — SIGNIFICANT CHANGE UP (ref 0–0)
PHOSPHATE SERPL-MCNC: 1.2 MG/DL — LOW (ref 2.5–4.5)
PLATELET # BLD AUTO: 237 K/UL — SIGNIFICANT CHANGE UP (ref 150–400)
POTASSIUM SERPL-MCNC: 2.8 MMOL/L — CRITICAL LOW (ref 3.5–5.3)
POTASSIUM SERPL-SCNC: 2.8 MMOL/L — CRITICAL LOW (ref 3.5–5.3)
PROT SERPL-MCNC: 7.7 GM/DL — SIGNIFICANT CHANGE UP (ref 6–8.3)
RBC # BLD: 4.41 M/UL — SIGNIFICANT CHANGE UP (ref 3.8–5.2)
RBC # FLD: 13.8 % — SIGNIFICANT CHANGE UP (ref 10.3–14.5)
SODIUM SERPL-SCNC: 135 MMOL/L — SIGNIFICANT CHANGE UP (ref 135–145)
WBC # BLD: 8.81 K/UL — SIGNIFICANT CHANGE UP (ref 3.8–10.5)
WBC # FLD AUTO: 8.81 K/UL — SIGNIFICANT CHANGE UP (ref 3.8–10.5)

## 2021-05-28 PROCEDURE — 93010 ELECTROCARDIOGRAM REPORT: CPT

## 2021-05-28 PROCEDURE — 99285 EMERGENCY DEPT VISIT HI MDM: CPT

## 2021-05-28 PROCEDURE — 71045 X-RAY EXAM CHEST 1 VIEW: CPT | Mod: 26

## 2021-05-28 RX ORDER — ONDANSETRON 8 MG/1
8 TABLET, FILM COATED ORAL ONCE
Refills: 0 | Status: COMPLETED | OUTPATIENT
Start: 2021-05-28 | End: 2021-05-28

## 2021-05-28 RX ORDER — SODIUM CHLORIDE 9 MG/ML
2000 INJECTION INTRAMUSCULAR; INTRAVENOUS; SUBCUTANEOUS ONCE
Refills: 0 | Status: DISCONTINUED | OUTPATIENT
Start: 2021-05-28 | End: 2021-05-28

## 2021-05-28 RX ORDER — POTASSIUM PHOSPHATE, MONOBASIC POTASSIUM PHOSPHATE, DIBASIC 236; 224 MG/ML; MG/ML
30 INJECTION, SOLUTION INTRAVENOUS ONCE
Refills: 0 | Status: COMPLETED | OUTPATIENT
Start: 2021-05-28 | End: 2021-05-28

## 2021-05-28 RX ORDER — SODIUM CHLORIDE 9 MG/ML
3000 INJECTION INTRAMUSCULAR; INTRAVENOUS; SUBCUTANEOUS ONCE
Refills: 0 | Status: COMPLETED | OUTPATIENT
Start: 2021-05-28 | End: 2021-05-28

## 2021-05-28 RX ORDER — POTASSIUM CHLORIDE 20 MEQ
20 PACKET (EA) ORAL
Refills: 0 | Status: COMPLETED | OUTPATIENT
Start: 2021-05-28 | End: 2021-05-29

## 2021-05-28 RX ORDER — FAMOTIDINE 10 MG/ML
20 INJECTION INTRAVENOUS ONCE
Refills: 0 | Status: COMPLETED | OUTPATIENT
Start: 2021-05-28 | End: 2021-05-28

## 2021-05-28 RX ORDER — POTASSIUM CHLORIDE 20 MEQ
20 PACKET (EA) ORAL ONCE
Refills: 0 | Status: DISCONTINUED | OUTPATIENT
Start: 2021-05-28 | End: 2021-05-28

## 2021-05-28 RX ADMIN — Medication 50 MILLIEQUIVALENT(S): at 23:25

## 2021-05-28 RX ADMIN — Medication 10 MILLIGRAM(S): at 18:49

## 2021-05-28 RX ADMIN — Medication 1 MILLIGRAM(S): at 20:44

## 2021-05-28 RX ADMIN — FAMOTIDINE 20 MILLIGRAM(S): 10 INJECTION INTRAVENOUS at 18:27

## 2021-05-28 RX ADMIN — POTASSIUM PHOSPHATE, MONOBASIC POTASSIUM PHOSPHATE, DIBASIC 83.33 MILLIMOLE(S): 236; 224 INJECTION, SOLUTION INTRAVENOUS at 22:10

## 2021-05-28 RX ADMIN — ONDANSETRON 8 MILLIGRAM(S): 8 TABLET, FILM COATED ORAL at 18:27

## 2021-05-28 RX ADMIN — Medication 20 MILLIEQUIVALENT(S): at 23:15

## 2021-05-28 RX ADMIN — Medication 50 MILLIEQUIVALENT(S): at 21:32

## 2021-05-28 RX ADMIN — SODIUM CHLORIDE 3000 MILLILITER(S): 9 INJECTION INTRAMUSCULAR; INTRAVENOUS; SUBCUTANEOUS at 18:27

## 2021-05-28 NOTE — ED ADULT NURSE NOTE - CHIEF COMPLAINT QUOTE
Has been binged drinking, sent to be hydrated to ED, last drink last week, abnormal labs metabolic acidosis and kidney failure  left on pt's answering machine by PMD, fine tremors noted

## 2021-05-28 NOTE — ED ADULT NURSE NOTE - PRIMARY CARE PROVIDER
Preanesthetic Assessment





- Anesthesia/Transfusion/Family Hx


Anesthesia History: Prior Anesthesia Without Reaction


Family History of Anesthesia Reaction: No


Transfusion History: No Prior Transfusion(s)


Intubation History: Unknown





- Physical Assessment


NPO Status Date: 05/13/20


NPO Status Time: 00:05


Height: 1.63 m


Weight: 77.111 kg





- Lab


Values: 





 Laboratory Last Values











WBC  11.60 K/uL (4.0-11.0)  H  05/13/20  03:45    


 


RBC  4.33 M/uL (4.30-5.90)   05/13/20  03:45    


 


Hgb  10.5 g/dL (12.0-16.0)  L  05/13/20  03:45    


 


Hct  33.8 % (36.0-46.0)  L  05/13/20  03:45    


 


MCV  78.1 fL (80.0-98.0)  L  05/13/20  03:45    


 


MCH  24.2 pg (27.0-32.0)  L  05/13/20  03:45    


 


MCHC  31.1 g/dL (31.0-37.0)   05/13/20  03:45    


 


RDW Std Deviation  43.0 fl (28.0-62.0)   05/13/20  03:45    


 


RDW Coeff of Jeny  15 % (11.0-15.0)   05/13/20  03:45    


 


Plt Count  189 K/uL (150-400)   05/13/20  03:45    


 


MPV  12.50 fL (7.40-12.00)  H  05/13/20  03:45    


 


Nucleated RBC %  0.0 /100WBC  05/13/20  03:45    


 


Nucleated RBCs #  0 K/uL  05/13/20  03:45    


 


Fetal Membrane Rupture  POSITIVE   05/13/20  03:20    














- Allergies


Allergies/Adverse Reactions: 


 Allergies











Allergy/AdvReac Type Severity Reaction Status Date / Time


 


No Known Allergies Allergy   Verified 05/13/20 03:33














- Acknowledgements


Anesthesia Type Planned: Epidural


Pt an Appropriate Candidate for the Planned Anesthesia: Yes


Alternatives and Risks of Anesthesia Discussed w Pt/Guardian: Yes


Pt/Guardian Understands and Agrees with Anesthesia Plan: Yes





PreAnesthesia Questionnaire


HEENT History: Reports: Impaired Vision


OB/GYN History: Reports: Pregnancy


Musculoskeletal History: Reports: None


Psychiatric History: Reports: Anxiety, Depression





- Past Surgical History


HEENT Surgical History: Reports: Oral Surgery


Musculoskeletal Surgical History: Reports: Other (See Below)


Other Musculoskeletal Surgeries/Procedures:: surgery on right index finger





- SUBSTANCE USE


Smoking Status *Q: Former Smoker


Tobacco Use Within Last Twelve Months: Cigarettes


Recreational Drug Use History: No





- HOME MEDS


Home Medications: 


 Home Meds





Escitalopram Oxalate [Lexapro] 20 mg PO DAILY 05/13/20 [History]


Terconazole  05/13/20 [History]


valACYclovir HCl [Valtrex] 1 tab PO BID 05/13/20 [History]











- CURRENT (IN HOUSE) MEDS


Current Meds: 





 Current Medications





Butorphanol Tartrate (Stadol)  1 mg IVPUSH Q1H PRN


   PRN Reason: Pain


Carboprost Tromethamine (Hemabate Ds)  250 mcg IM ASDIRECTED PRN


   PRN Reason: Post Partum Hemorrhage


Ampicillin Sodium 1 gm/ Sodium (Chloride)  50 mls @ 100 mls/hr IV Q4H GARTH


Lactated Ringer's (Ringers, Lactated)  1,000 mls @ 150 mls/hr IV ASDIRECTED [facility]


   Last Admin: 05/13/20 04:29 Dose:  999 mls/hr


Oxytocin/Sodium Chloride (Oxytocin 30 Unit/500 Ml-Ns)  30 unit in 500 mls @ 999 

mls/hr IV TITRATE GARTH


Tranexamic Acid 1,000 mg/ (Sodium Chloride)  110 mls @ 660 mls/hr IV ONETIME PRN


   PRN Reason: Bleeding


Lidocaine HCl (Xylocaine 1%)  50 ml INJECT ONETIME PRN


   PRN Reason: Laceration repair


Methylergonovine Maleate (Methergine)  0.2 mg IM ASDIRECTED PRN


   PRN Reason: Post Partum Hemorrhage


Misoprostol (Cytotec)  200 mcg PO ONETIME PRN


   PRN Reason: Post Partum Hemorrhage


Nalbuphine HCl (Nubain)  10 mg IVPUSH Q1H PRN


   PRN Reason: Pain (severe 7-10)


Sodium Chloride (Saline Flush)  10 ml FLUSH ASDIRECTED PRN


   PRN Reason: Keep Vein Open


Sodium Chloride (Saline Flush)  2.5 ml FLUSH ASDIRECTED PRN


   PRN Reason: Keep Vein Open


Sodium Chloride (Normal Saline)  10 ml IV ASDIRECTED PRN


   PRN Reason: IV Use


Sterile Water (Sterile Water For Irrigation)  1,000 ml IRR ASDIRECTED PRN


   PRN Reason: delivery





Discontinued Medications





Fentanyl (Sublimaze) Confirm Administered Dose 100 mcg .ROUTE .STK-MED ONE


   Stop: 05/13/20 04:19


Ampicillin Sodium 2 gm/ Sodium (Chloride)  100 mls @ 200 mls/hr IV ONETIME ONE


   Stop: 05/13/20 04:29


   Last Admin: 05/13/20 03:54 Dose:  200 mls/hr


Ropivacaine (Naropin 0.2%) Confirm Administered Dose 100 mls @ as directed 

.ROUTE .STK-MED ONE


   Stop: 05/13/20 04:19 MD

## 2021-05-28 NOTE — ED PROVIDER NOTE - CLINICAL SUMMARY MEDICAL DECISION MAKING FREE TEXT BOX
Pt with mild withdrawals and NV and dehydration., Will repeat labs, hydrate and reassess. Patient signed out to incoming physician.  All decisions regarding the progression of care will be made at their discretion.

## 2021-05-28 NOTE — ED PROVIDER NOTE - PROGRESS NOTE DETAILS
Ro: Pt care signed out to me at change of shift. Pending CMP, IVF. Anticipate d/c. On re-eval, pt noted to be in bigeminy on monitor. Pt w/ active emesis, pt complaining of extreme anxiety / SOB c/w panic attack. Additional meds ordered. Ro: Pt improved s/p Ativan. K+ resulted 2.8 / Phos resulted 1.2, will order IV replacement. Dichter: Suzette FERRERA / Phos WNL. Pt resting comfortably, request Jordyter: Rpt K / Phos WNL. Rhythm NSR on monitor. Pt resting comfortably, requesting d/c home. Stable for d/c home. Pt instructed for close outpatient f/u w/ PCP. Return signs and symptoms d/w pt. She understands and agrees w/ this plan.

## 2021-05-28 NOTE — ED PROVIDER NOTE - PATIENT PORTAL LINK FT
You can access the FollowMyHealth Patient Portal offered by [facility] by registering at the following website: [email or web address]. By joining [employer]’s FollowMyHealth portal, you will also be able to view your health information using other applications (apps) compatible with our system.

## 2021-05-28 NOTE — ED PROVIDER NOTE - OBJECTIVE STATEMENT
56yo female with pmh etoh abuse, GERD,  presents with dehydration. Per pt, she normally binge drinks and was binge drinking for 1 week. Pt had physical on monday with pmd and had labs. Pt called PMD 2 days ago to tell her she wasn't feeling well, + vomiting, tremors, unable to tolerate any oral intake. PMD saw pt yesterday and did labs and noted 10lb wt loss in 3 days. PT reports tremors improved, but still unable to tolerate po. Labs noted with CO2 of 15, BUN 15/Cr 1.03. PT reports she is sob when she withdraws.     No fever/chills, No photophobia/eye pain/changes in vision, No ear pain/sore throat/dysphagia, No chest pain/palpitations, +SOB, no cough, no wheeze/stridor, No abdominal pain, +N/V, no D, no dysuria/frequency/discharge, No neck/back pain, no rash, no changes in neurological status/function.

## 2021-05-28 NOTE — ED ADULT NURSE NOTE - OBJECTIVE STATEMENT
REceived pt in bed 5.  56yo F AOx3 with pmh etoh abuse, GERD,  presents with dehydration. Per pt, she normally binge drinks and was binge drinking for 1 week. Pt had physical on monday with pmd and had labs. Pt called PMD 2 days ago to tell her she wasn't feeling well, + vomiting, tremors, unable to tolerate any oral intake. PMD saw pt yesterday and did labs and noted 10lb wt loss in 3 days. PT reports tremors improved, but still unable to tolerate po. Labs noted with CO2 of 15, BUN 15/Cr 1.03. PT reports she is sob when she withdraws.

## 2021-05-28 NOTE — ED ADULT TRIAGE NOTE - CHIEF COMPLAINT QUOTE
Has been binged drinking, sent to be hydrated to ED, last drink last week, abnormal labs metabolic acidosis and kidney failure  left on pt's answering machine by PMD Has been binged drinking, sent to be hydrated to ED, last drink last week, abnormal labs metabolic acidosis and kidney failure  left on pt's answering machine by PMD, fine tremors noted

## 2021-05-29 VITALS
RESPIRATION RATE: 17 BRPM | SYSTOLIC BLOOD PRESSURE: 165 MMHG | TEMPERATURE: 98 F | DIASTOLIC BLOOD PRESSURE: 81 MMHG | OXYGEN SATURATION: 96 % | HEART RATE: 99 BPM

## 2021-05-29 LAB
ALBUMIN SERPL ELPH-MCNC: 3.8 G/DL — SIGNIFICANT CHANGE UP (ref 3.3–5)
ALP SERPL-CCNC: 58 U/L — SIGNIFICANT CHANGE UP (ref 40–120)
ALT FLD-CCNC: 30 U/L — SIGNIFICANT CHANGE UP (ref 12–78)
ANION GAP SERPL CALC-SCNC: 7 MMOL/L — SIGNIFICANT CHANGE UP (ref 5–17)
AST SERPL-CCNC: 32 U/L — SIGNIFICANT CHANGE UP (ref 15–37)
BILIRUB SERPL-MCNC: 1 MG/DL — SIGNIFICANT CHANGE UP (ref 0.2–1.2)
BUN SERPL-MCNC: 16 MG/DL — SIGNIFICANT CHANGE UP (ref 7–23)
CALCIUM SERPL-MCNC: 8.5 MG/DL — SIGNIFICANT CHANGE UP (ref 8.5–10.1)
CHLORIDE SERPL-SCNC: 103 MMOL/L — SIGNIFICANT CHANGE UP (ref 96–108)
CO2 SERPL-SCNC: 27 MMOL/L — SIGNIFICANT CHANGE UP (ref 22–31)
CREAT SERPL-MCNC: 0.85 MG/DL — SIGNIFICANT CHANGE UP (ref 0.5–1.3)
GLUCOSE SERPL-MCNC: 122 MG/DL — HIGH (ref 70–99)
PHOSPHATE SERPL-MCNC: 4 MG/DL — SIGNIFICANT CHANGE UP (ref 2.5–4.5)
POTASSIUM SERPL-MCNC: 3.8 MMOL/L — SIGNIFICANT CHANGE UP (ref 3.5–5.3)
POTASSIUM SERPL-SCNC: 3.8 MMOL/L — SIGNIFICANT CHANGE UP (ref 3.5–5.3)
PROT SERPL-MCNC: 7.1 GM/DL — SIGNIFICANT CHANGE UP (ref 6–8.3)
SODIUM SERPL-SCNC: 137 MMOL/L — SIGNIFICANT CHANGE UP (ref 135–145)

## 2021-05-29 RX ORDER — FAMOTIDINE 10 MG/ML
20 INJECTION INTRAVENOUS ONCE
Refills: 0 | Status: COMPLETED | OUTPATIENT
Start: 2021-05-29 | End: 2021-05-29

## 2021-05-29 RX ADMIN — POTASSIUM PHOSPHATE, MONOBASIC POTASSIUM PHOSPHATE, DIBASIC 30 MILLIMOLE(S): 236; 224 INJECTION, SOLUTION INTRAVENOUS at 01:32

## 2021-05-29 RX ADMIN — SODIUM CHLORIDE 3000 MILLILITER(S): 9 INJECTION INTRAMUSCULAR; INTRAVENOUS; SUBCUTANEOUS at 01:32

## 2021-05-29 RX ADMIN — Medication 20 MILLIEQUIVALENT(S): at 01:32

## 2021-05-29 RX ADMIN — FAMOTIDINE 20 MILLIGRAM(S): 10 INJECTION INTRAVENOUS at 00:40

## 2021-05-29 NOTE — ED ADULT NURSE REASSESSMENT NOTE - NS ED NURSE REASSESS COMMENT FT1
patient wants to go home, stated does not want feel comfortable and wants to sleep at her own bed, redrew cmp and phos and came back within normal limits, dr boswell stated no need to continue the potassium phos and potassium chloride and can be discontinued. pending medicaid cab

## 2021-06-02 ENCOUNTER — APPOINTMENT (OUTPATIENT)
Dept: FAMILY MEDICINE | Facility: CLINIC | Age: 56
End: 2021-06-02
Payer: MEDICAID

## 2021-06-02 VITALS — WEIGHT: 159 LBS | BODY MASS INDEX: 26.46 KG/M2

## 2021-06-02 VITALS
DIASTOLIC BLOOD PRESSURE: 80 MMHG | HEART RATE: 80 BPM | WEIGHT: 144 LBS | TEMPERATURE: 97.6 F | OXYGEN SATURATION: 99 % | HEIGHT: 65 IN | BODY MASS INDEX: 23.99 KG/M2 | SYSTOLIC BLOOD PRESSURE: 135 MMHG

## 2021-06-02 DIAGNOSIS — Z86.69 PERSONAL HISTORY OF OTHER DISEASES OF THE NERVOUS SYSTEM AND SENSE ORGANS: ICD-10-CM

## 2021-06-02 PROCEDURE — 99495 TRANSJ CARE MGMT MOD F2F 14D: CPT

## 2021-06-02 RX ORDER — OMEGA-3/DHA/EPA/FISH OIL 35-113.5MG
1000 TABLET,CHEWABLE ORAL
Qty: 30 | Refills: 5 | Status: ACTIVE | COMMUNITY
Start: 2020-06-28 | End: 1900-01-01

## 2021-06-02 RX ORDER — ERGOCALCIFEROL 1.25 MG/1
1.25 MG CAPSULE, LIQUID FILLED ORAL
Qty: 12 | Refills: 5 | Status: ACTIVE | COMMUNITY
Start: 2019-11-01 | End: 1900-01-01

## 2021-06-02 NOTE — HEALTH RISK ASSESSMENT
[Yes] : Yes [4 or more  times a week (4 pts)] : 4 or more  times a week (4 points) [7 to 9 (3 pts)] : 7 to 9 (3  points)

## 2021-06-03 NOTE — HISTORY OF PRESENT ILLNESS
[Post-hospitalization from ___ Hospital] : Post-hospitalization from [unfilled] Hospital [Discharge Summary] : discharge summary [Pertinent Labs] : pertinent labs [Admitted on: ___] : The patient was admitted on [unfilled] [Discharged on ___] : discharged on [unfilled] [FreeTextEntry2] : s/p 10 lb wgt loss, vomiting, went to hospital for one night, iv hydrated w/ K+ supplementation due to bigeminy\par feeling better, not drinking\par looking into getting in to the [facility] program\par engaged w/ [facility] [facility]

## 2021-06-03 NOTE — PLAN
[FreeTextEntry1] : cont f/u w/ [employer] and [facility] (referred by SW)\par Allowed to vent, emotional support provided

## 2021-06-03 NOTE — PHYSICAL EXAM
[No Acute Distress] : no acute distress [Well Nourished] : well nourished [Normal Sclera/Conjunctiva] : normal sclera/conjunctiva [EOMI] : extraocular movements intact [Normal Outer Ear/Nose] : the outer ears and nose were normal in appearance [No JVD] : no jugular venous distention [No Respiratory Distress] : no respiratory distress  [No Accessory Muscle Use] : no accessory muscle use [Coordination Grossly Intact] : coordination grossly intact [No Focal Deficits] : no focal deficits [Normal Gait] : normal gait [Normal Affect] : the affect was normal [Alert and Oriented x3] : oriented to person, place, and time [Normal Insight/Judgement] : insight and judgment were intact [71806 - Moderate Complexity requires multiple possible diagnoses and/or the management options, moderate complexity of the medical data (tests, etc.) to be reviewed, and moderate risk of significant complications, morbidity, and/or mortality as well as co] : Moderate Complexity [de-identified] : improved

## 2021-06-07 ENCOUNTER — RX RENEWAL (OUTPATIENT)
Age: 56
End: 2021-06-07

## 2021-06-21 ENCOUNTER — APPOINTMENT (OUTPATIENT)
Dept: FAMILY MEDICINE | Facility: CLINIC | Age: 56
End: 2021-06-21

## 2021-06-23 ENCOUNTER — APPOINTMENT (OUTPATIENT)
Dept: FAMILY MEDICINE | Facility: CLINIC | Age: 56
End: 2021-06-23
Payer: MEDICAID

## 2021-06-23 VITALS
HEART RATE: 125 BPM | DIASTOLIC BLOOD PRESSURE: 80 MMHG | HEIGHT: 65 IN | OXYGEN SATURATION: 97 % | SYSTOLIC BLOOD PRESSURE: 115 MMHG | TEMPERATURE: 97.6 F | WEIGHT: 159 LBS | BODY MASS INDEX: 26.49 KG/M2

## 2021-06-23 DIAGNOSIS — R25.2 CRAMP AND SPASM: ICD-10-CM

## 2021-06-23 DIAGNOSIS — Z87.898 PERSONAL HISTORY OF OTHER SPECIFIED CONDITIONS: ICD-10-CM

## 2021-06-23 DIAGNOSIS — Z87.39 PERSONAL HISTORY OF OTHER DISEASES OF THE MUSCULOSKELETAL SYSTEM AND CONNECTIVE TISSUE: ICD-10-CM

## 2021-06-23 DIAGNOSIS — Z86.39 PERSONAL HISTORY OF OTHER ENDOCRINE, NUTRITIONAL AND METABOLIC DISEASE: ICD-10-CM

## 2021-06-23 PROCEDURE — 99214 OFFICE O/P EST MOD 30 MIN: CPT

## 2021-06-23 RX ORDER — NALTREXONE 380 MG
380 KIT INTRAMUSCULAR
Refills: 0 | Status: COMPLETED | OUTPATIENT
Start: 2021-06-23

## 2021-06-23 RX ADMIN — NALTREXONE 0 MG: KIT at 00:00

## 2021-06-23 NOTE — PHYSICAL EXAM
[No Acute Distress] : no acute distress [Well Nourished] : well nourished [Normal Sclera/Conjunctiva] : normal sclera/conjunctiva [EOMI] : extraocular movements intact [Normal Outer Ear/Nose] : the outer ears and nose were normal in appearance [Normal] : normal rate, regular rhythm, normal S1 and S2 and no murmur heard [Normal Gait] : normal gait [Coordination Grossly Intact] : coordination grossly intact [Normal Affect] : the affect was normal [Alert and Oriented x3] : oriented to person, place, and time [Normal Insight/Judgement] : insight and judgment were intact

## 2021-06-23 NOTE — HISTORY OF PRESENT ILLNESS
[FreeTextEntry1] : here for vivitrol shot\par went to her first group mtg last night, has not drank etoh

## 2021-06-28 ENCOUNTER — RX RENEWAL (OUTPATIENT)
Age: 56
End: 2021-06-28

## 2021-07-08 ENCOUNTER — APPOINTMENT (OUTPATIENT)
Dept: FAMILY MEDICINE | Facility: CLINIC | Age: 56
End: 2021-07-08
Payer: MEDICAID

## 2021-07-08 VITALS
DIASTOLIC BLOOD PRESSURE: 65 MMHG | BODY MASS INDEX: 26.49 KG/M2 | OXYGEN SATURATION: 96 % | WEIGHT: 159 LBS | SYSTOLIC BLOOD PRESSURE: 100 MMHG | HEIGHT: 65 IN | TEMPERATURE: 97.8 F | HEART RATE: 97 BPM

## 2021-07-08 PROCEDURE — 99214 OFFICE O/P EST MOD 30 MIN: CPT

## 2021-07-08 NOTE — PHYSICAL EXAM
[No Acute Distress] : no acute distress [Well Nourished] : well nourished [Normal Sclera/Conjunctiva] : normal sclera/conjunctiva [EOMI] : extraocular movements intact [Normal Outer Ear/Nose] : the outer ears and nose were normal in appearance [No JVD] : no jugular venous distention [No Respiratory Distress] : no respiratory distress  [Coordination Grossly Intact] : coordination grossly intact [Normal Affect] : the affect was normal [Alert and Oriented x3] : oriented to person, place, and time [Normal Insight/Judgement] : insight and judgment were intact

## 2021-07-08 NOTE — PLAN
[FreeTextEntry1] : increased phentermine to 30 mg until wedding in sept\par cont mtgs\par cont vivitrol

## 2021-07-08 NOTE — HISTORY OF PRESENT ILLNESS
[FreeTextEntry1] : here for follow up\par not drinking anymore ETOH, attending group mtgs 2x/wk, as well as having wkly individual mtgs\par c/o tooth infection, hx of broken tooth, pain and swelling to left upper side, has dental appt next fri \par would also like phentermine refills\par c/o "ball" to buttock from vivitrol shot

## 2021-07-27 ENCOUNTER — RX RENEWAL (OUTPATIENT)
Age: 56
End: 2021-07-27

## 2021-07-28 ENCOUNTER — APPOINTMENT (OUTPATIENT)
Dept: FAMILY MEDICINE | Facility: CLINIC | Age: 56
End: 2021-07-28
Payer: MEDICAID

## 2021-07-28 VITALS
DIASTOLIC BLOOD PRESSURE: 80 MMHG | TEMPERATURE: 98.1 F | BODY MASS INDEX: 26.49 KG/M2 | OXYGEN SATURATION: 98 % | HEART RATE: 79 BPM | HEIGHT: 65 IN | WEIGHT: 159 LBS | SYSTOLIC BLOOD PRESSURE: 130 MMHG

## 2021-07-28 DIAGNOSIS — K08.89 OTHER SPECIFIED DISORDERS OF TEETH AND SUPPORTING STRUCTURES: ICD-10-CM

## 2021-07-28 DIAGNOSIS — Z09 ENCOUNTER FOR FOLLOW-UP EXAMINATION AFTER COMPLETED TREATMENT FOR CONDITIONS OTHER THAN MALIGNANT NEOPLASM: ICD-10-CM

## 2021-07-28 DIAGNOSIS — T88.7XXA UNSPECIFIED ADVERSE EFFECT OF DRUG OR MEDICAMENT, INITIAL ENCOUNTER: ICD-10-CM

## 2021-07-28 DIAGNOSIS — R22.0 LOCALIZED SWELLING, MASS AND LUMP, HEAD: ICD-10-CM

## 2021-07-28 PROCEDURE — 99214 OFFICE O/P EST MOD 30 MIN: CPT

## 2021-07-28 RX ORDER — AMOXICILLIN 500 MG/1
500 CAPSULE ORAL 3 TIMES DAILY
Qty: 21 | Refills: 0 | Status: DISCONTINUED | COMMUNITY
Start: 2021-07-08 | End: 2021-07-28

## 2021-07-28 NOTE — PLAN
[FreeTextEntry1] : vivitrol administered to rt iliac crest\par cont phentermine\par allowed to vent, emotional support provided\par rtc in 1 mth

## 2021-07-28 NOTE — PHYSICAL EXAM
[No Acute Distress] : no acute distress [Well Nourished] : well nourished [Normal Sclera/Conjunctiva] : normal sclera/conjunctiva [EOMI] : extraocular movements intact [Normal Outer Ear/Nose] : the outer ears and nose were normal in appearance [No JVD] : no jugular venous distention [No Respiratory Distress] : no respiratory distress  [No Accessory Muscle Use] : no accessory muscle use [Coordination Grossly Intact] : coordination grossly intact [Normal Gait] : normal gait [Normal Affect] : the affect was normal [Alert and Oriented x3] : oriented to person, place, and time [Normal Insight/Judgement] : insight and judgment were intact [Normal Rate] : normal rate

## 2021-08-04 ENCOUNTER — APPOINTMENT (OUTPATIENT)
Dept: RHEUMATOLOGY | Facility: CLINIC | Age: 56
End: 2021-08-04
Payer: MEDICAID

## 2021-08-04 VITALS — SYSTOLIC BLOOD PRESSURE: 119 MMHG | HEART RATE: 94 BPM | DIASTOLIC BLOOD PRESSURE: 80 MMHG

## 2021-08-04 PROCEDURE — 99214 OFFICE O/P EST MOD 30 MIN: CPT

## 2021-08-04 NOTE — ASSESSMENT
[FreeTextEntry1] : 55 year old female with:\par 1)  Polyarticular joint pains:  While pt was found to have a weakly positive CCP and elevated ESR (though normal CRP), her presentation is most c/w non-inflammatory arthritis, yovany given lack of improvement on prednisone.\par   - Reiterated importance of exercise\par   - ibuprofen and/or Tylenol prn\par   - warm compresses\par   - OTC topical analgesics\par 2)  Neck pain:  x-rays revealed OA/DDD:  improved.\par   - Reiterated importance of neck exercises\par   - analgesia prn as above\par 3) B/L shoulder pain:  most c/w RTC tendonopathy.  currently much improved on left but w/ severe pain on right.\par   - Injected Depo Medrol 40mg to right shoulder.\par   - Cont shoulder exercises\par   - analgesia as above\par 4)  Low back pain / hip pain:  no red flag symptoms\par   - Back exercises\par   - analgesia as above\par   - x-rays of L-spine, hips.\par 5)  (+)NICOLE:  no obvious si/sx of underlying CTD and all sub-serologies negative\par   - Cont to monitor.

## 2021-08-04 NOTE — ASSESSMENT
[FreeTextEntry1] : 55 year old female with:\par 1)  Polyarticular joint pains:  While pt was found to have a weakly positive CCP and elevated ESR (though normal CRP), her presentation is most c/w non-inflammatory arthritis, yovany given lack of improvement on prednisone.\par   - Reiterated importance of exercise\par   - Cont naproxen 500mg BID prn and/or Tylenol prn\par   - warm compresses\par   - OTC topical analgesics\par 2)  Neck pain:  x-rays revealed OA/DDD:  improved.\par   - Reiterated importance of neck exercises\par   - analgesia prn as above\par 3) B/L shoulder pain:  most c/w RTC tendonopathy.  much improved s/p corticosteroid injection to right shoulder at last visit.\par   - Cont shoulder exercises\par   - analgesia as above\par 4)  Low back pain / hip pain:  no red flag symptoms.  Currently much improved.\par   - Back exercises\par   - analgesia as above\par 5)  (+)NICOLE:  no obvious si/sx of underlying CTD and all sub-serologies negative\par   - Cont to monitor.

## 2021-08-04 NOTE — PHYSICAL EXAM
[General Appearance - Alert] : alert [General Appearance - In No Acute Distress] : in no acute distress [Sclera] : the sclera and conjunctiva were normal [Outer Ear] : the ears and nose were normal in appearance [Oropharynx] : the oropharynx was normal [Neck Appearance] : the appearance of the neck was normal [Neck Cervical Mass (___cm)] : no neck mass was observed [Jugular Venous Distention Increased] : there was no jugular-venous distention [Thyroid Diffuse Enlargement] : the thyroid was not enlarged [Thyroid Nodule] : there were no palpable thyroid nodules [Auscultation Breath Sounds / Voice Sounds] : lungs were clear to auscultation bilaterally [Heart Rate And Rhythm] : heart rate was normal and rhythm regular [Heart Sounds] : normal S1 and S2 [Heart Sounds Gallop] : no gallops [Murmurs] : no murmurs [Heart Sounds Pericardial Friction Rub] : no pericardial rub [Edema] : there was no peripheral edema [Bowel Sounds] : normal bowel sounds [Abdomen Soft] : soft [Abdomen Tenderness] : non-tender [Abdomen Mass (___ Cm)] : no abdominal mass palpated [Cervical Lymph Nodes Enlarged Posterior Bilaterally] : posterior cervical [Cervical Lymph Nodes Enlarged Anterior Bilaterally] : anterior cervical [Supraclavicular Lymph Nodes Enlarged Bilaterally] : supraclavicular [No Spinal Tenderness] : no spinal tenderness [Skin Color & Pigmentation] : normal skin color and pigmentation [Skin Turgor] : normal skin turgor [] : no rash [No Focal Deficits] : no focal deficits [Oriented To Time, Place, And Person] : oriented to person, place, and time [Impaired Insight] : insight and judgment were intact [Affect] : the affect was normal [FreeTextEntry1] : No synovitis, right shoulder w/ painful decreased abduction and internal rotation, (+)impingement;  full ROM in rest of joints;  neck w/ normal ROM as well\par

## 2021-08-04 NOTE — PHYSICAL EXAM
[General Appearance - Alert] : alert [General Appearance - In No Acute Distress] : in no acute distress [Sclera] : the sclera and conjunctiva were normal [Outer Ear] : the ears and nose were normal in appearance [Oropharynx] : the oropharynx was normal [Neck Appearance] : the appearance of the neck was normal [Neck Cervical Mass (___cm)] : no neck mass was observed [Jugular Venous Distention Increased] : there was no jugular-venous distention [Thyroid Diffuse Enlargement] : the thyroid was not enlarged [Thyroid Nodule] : there were no palpable thyroid nodules [Auscultation Breath Sounds / Voice Sounds] : lungs were clear to auscultation bilaterally [Heart Rate And Rhythm] : heart rate was normal and rhythm regular [Heart Sounds] : normal S1 and S2 [Heart Sounds Gallop] : no gallops [Murmurs] : no murmurs [Heart Sounds Pericardial Friction Rub] : no pericardial rub [Edema] : there was no peripheral edema [Bowel Sounds] : normal bowel sounds [Abdomen Soft] : soft [Abdomen Tenderness] : non-tender [Abdomen Mass (___ Cm)] : no abdominal mass palpated [Cervical Lymph Nodes Enlarged Posterior Bilaterally] : posterior cervical [Cervical Lymph Nodes Enlarged Anterior Bilaterally] : anterior cervical [Supraclavicular Lymph Nodes Enlarged Bilaterally] : supraclavicular [No Spinal Tenderness] : no spinal tenderness [Skin Color & Pigmentation] : normal skin color and pigmentation [Skin Turgor] : normal skin turgor [] : no rash [No Focal Deficits] : no focal deficits [Oriented To Time, Place, And Person] : oriented to person, place, and time [Impaired Insight] : insight and judgment were intact [Affect] : the affect was normal [FreeTextEntry1] : No synovitis, right shoulder now w/ full ROM, though w/ mild pain upon abduction and internal rotation;  full ROM in rest of joints;  neck w/ normal ROM as well\par

## 2021-08-04 NOTE — HISTORY OF PRESENT ILLNESS
[Fatigue] : fatigue [Arthralgias] : arthralgias [Joint Swelling] : joint swelling [Morning Stiffness] : morning stiffness [FreeTextEntry1] : Feeling much better overall since last visit.  Right shoulder pain has virtually resolved s/p C-S injection at last visit.  The pain in her lower back, hips, and knees has also significantly improved.  No new complaints. [Anorexia] : no anorexia [Weight Loss] : no weight loss [Malaise] : no malaise [Fever] : no fever [Chills] : no chills [Malar Facial Rash] : no malar facial rash [Skin Lesions] : no lesions [Skin Nodules] : no skin nodules [Oral Ulcers] : no oral ulcers [Cough] : no cough [Dry Mouth] : no dry mouth [Dysphonia] : no dysphonia [Dysphagia] : no dysphagia [Shortness of Breath] : no shortness of breath [Chest Pain] : no chest pain [Joint Warmth] : no joint warmth [Joint Deformity] : no joint deformity [Decreased ROM] : no decreased range of motion [Falls] : no falls [Dyspnea] : no dyspnea [Myalgias] : no myalgias [Muscle Weakness] : no muscle weakness [Muscle Spasms] : no muscle spasms [Muscle Cramping] : no muscle cramping [Visual Changes] : no visual changes [Eye Pain] : no eye pain [Eye Redness] : no eye redness [Dry Eyes] : no dry eyes

## 2021-08-04 NOTE — HISTORY OF PRESENT ILLNESS
[Fatigue] : fatigue [Arthralgias] : arthralgias [Joint Swelling] : joint swelling [Morning Stiffness] : morning stiffness [FreeTextEntry1] : Pt reports she had no improvement when taking prednisone for a week.  She currently c/o pain in her right shoulder - the pain is constant though the severity fluctuates.  It's typically worse with activity or with lying on her right side.  Neck pain has improved significantly.  She also c/o pain in her lower back, hips, and knees - occurs upon getting up after prolonged inactivity.  No joint swelling.  No improvement w/ Advil, Tylenol, or Icy Hot.\par  [Anorexia] : no anorexia [Weight Loss] : no weight loss [Malaise] : no malaise [Fever] : no fever [Chills] : no chills [Malar Facial Rash] : no malar facial rash [Skin Lesions] : no lesions [Skin Nodules] : no skin nodules [Oral Ulcers] : no oral ulcers [Cough] : no cough [Dry Mouth] : no dry mouth [Dysphonia] : no dysphonia [Dysphagia] : no dysphagia [Shortness of Breath] : no shortness of breath [Chest Pain] : no chest pain [Joint Warmth] : no joint warmth [Joint Deformity] : no joint deformity [Decreased ROM] : no decreased range of motion [Falls] : no falls [Dyspnea] : no dyspnea [Myalgias] : no myalgias [Muscle Weakness] : no muscle weakness [Muscle Spasms] : no muscle spasms [Muscle Cramping] : no muscle cramping [Visual Changes] : no visual changes [Eye Pain] : no eye pain [Eye Redness] : no eye redness [Dry Eyes] : no dry eyes

## 2021-08-09 ENCOUNTER — APPOINTMENT (OUTPATIENT)
Dept: INTERNAL MEDICINE | Facility: CLINIC | Age: 56
End: 2021-08-09
Payer: MEDICAID

## 2021-08-09 VITALS
HEART RATE: 94 BPM | HEIGHT: 65 IN | BODY MASS INDEX: 25.83 KG/M2 | WEIGHT: 155 LBS | DIASTOLIC BLOOD PRESSURE: 80 MMHG | OXYGEN SATURATION: 99 % | TEMPERATURE: 98 F | SYSTOLIC BLOOD PRESSURE: 145 MMHG

## 2021-08-09 PROCEDURE — 99213 OFFICE O/P EST LOW 20 MIN: CPT

## 2021-08-09 NOTE — HISTORY OF PRESENT ILLNESS
[FreeTextEntry1] : On Vivitrol shot to stop ETOH. she has some side effects.. she has a history of binge ETOH drinking. \par  last colon and EGD in 2020\par  h/o cyclic vomiting syndrome  from ETOH abuse.

## 2021-08-13 ENCOUNTER — APPOINTMENT (OUTPATIENT)
Dept: FAMILY MEDICINE | Facility: CLINIC | Age: 56
End: 2021-08-13
Payer: MEDICAID

## 2021-08-13 VITALS
HEIGHT: 65 IN | DIASTOLIC BLOOD PRESSURE: 80 MMHG | BODY MASS INDEX: 25.99 KG/M2 | SYSTOLIC BLOOD PRESSURE: 130 MMHG | HEART RATE: 121 BPM | WEIGHT: 156 LBS | OXYGEN SATURATION: 97 %

## 2021-08-13 DIAGNOSIS — R25.1 TREMOR, UNSPECIFIED: ICD-10-CM

## 2021-08-13 PROCEDURE — 99214 OFFICE O/P EST MOD 30 MIN: CPT

## 2021-08-13 NOTE — PLAN
[FreeTextEntry1] : refill rx\par renforced to take buspar daily; restart lexapro; cont therapy\par vivitrol monthly (pt will skip this mth, son's wedding coming up to avoid SE)\par rtc in 1 mth

## 2021-08-13 NOTE — PHYSICAL EXAM
[No Acute Distress] : no acute distress [Well Nourished] : well nourished [Normal Sclera/Conjunctiva] : normal sclera/conjunctiva [EOMI] : extraocular movements intact [Normal Outer Ear/Nose] : the outer ears and nose were normal in appearance [No JVD] : no jugular venous distention [No Respiratory Distress] : no respiratory distress  [No Accessory Muscle Use] : no accessory muscle use [Coordination Grossly Intact] : coordination grossly intact [Normal Affect] : the affect was normal [Alert and Oriented x3] : oriented to person, place, and time [Normal Insight/Judgement] : insight and judgment were intact [de-identified] : no shakes today

## 2021-08-13 NOTE — HISTORY OF PRESENT ILLNESS
[FreeTextEntry1] : here for follow-up; feeling better\siddharth doesn't feel well after taking vivitrol for a few days, but then it's back to her normal after\par did have the shakes the other day, would like to have clonopin refill; denies ETOH\siddharth has not taken buspar, lexapro or seroquel consistently

## 2021-09-09 ENCOUNTER — APPOINTMENT (OUTPATIENT)
Dept: FAMILY MEDICINE | Facility: CLINIC | Age: 56
End: 2021-09-09

## 2021-09-09 ENCOUNTER — APPOINTMENT (OUTPATIENT)
Dept: ORTHOPEDIC SURGERY | Facility: CLINIC | Age: 56
End: 2021-09-09
Payer: MEDICAID

## 2021-09-09 VITALS — WEIGHT: 150 LBS | OXYGEN SATURATION: 98 % | HEART RATE: 120 BPM | BODY MASS INDEX: 24.99 KG/M2 | HEIGHT: 65 IN

## 2021-09-09 PROCEDURE — 73030 X-RAY EXAM OF SHOULDER: CPT | Mod: RT

## 2021-09-09 PROCEDURE — 99214 OFFICE O/P EST MOD 30 MIN: CPT

## 2021-09-13 NOTE — PHYSICAL EXAM
[de-identified] : Patient is comfortable in exam room\par Right shoulder and arm\par -Pain with motion of the shoulder\par -Skin is intact mild ecchymosis\par -She is neurovascularly intact throughout the right arm [de-identified] : X-rays of the right shoulder were taken in the office today.  They include AP, IR, ER, and axillary.  X-rays show nondisplaced proximal humerus fracture.  There is an anatomic neck fracture as well as a greater tuberosity fracture.  Overall alignment of the shoulder looks good the glenohumeral joint is reduced

## 2021-09-13 NOTE — HISTORY OF PRESENT ILLNESS
[de-identified] : This is a 55-year-old right-hand-dominant woman here for follow-up and evaluation of her right shoulder fracture 9/6/2021. She fell while walking her dog. suspected deep tissue injury

## 2021-09-13 NOTE — ASSESSMENT
[FreeTextEntry1] : This is a 55-year-old woman with right minimally displaced proximal humerus fracture.\par -Long discussion was held with the patient regarding treatment options including operative and nonoperative treatment pathways.  Patient showed a good understanding of her injury and treatment options.  She chose to move forward with nonoperative management of the right proximal humerus fracture\par -Follow-up 2 weeks and start physical therapy at that time\par -Pendulum exercises\par -Tylenol and Motrin for pain\par -Sling for comfort\par -All of the patient's questions and concerns were addressed during this visit

## 2021-09-20 ENCOUNTER — NON-APPOINTMENT (OUTPATIENT)
Age: 56
End: 2021-09-20

## 2021-09-23 ENCOUNTER — APPOINTMENT (OUTPATIENT)
Dept: ORTHOPEDIC SURGERY | Facility: CLINIC | Age: 56
End: 2021-09-23
Payer: MEDICAID

## 2021-09-23 PROCEDURE — 99213 OFFICE O/P EST LOW 20 MIN: CPT

## 2021-09-23 PROCEDURE — 73030 X-RAY EXAM OF SHOULDER: CPT | Mod: RT

## 2021-09-23 NOTE — HISTORY OF PRESENT ILLNESS
[de-identified] : This is a 55-year-old right-hand-dominant woman here for follow-up and evaluation of her right shoulder fracture 9/6/2021, treated nonoperatively.  She is still having pain and some difficulty sleeping.  She was not able to tolerate the Percocet due to nausea.  She denies any numbness or tingling in her hand

## 2021-09-23 NOTE — PHYSICAL EXAM
[de-identified] : Patient is comfortable in exam room\par Right shoulder and arm\par -Pain with motion of the shoulder\par -Forward elevation 80 degrees, external rotation neutral, abduction 45, internal rotation belly\par -Skin is intact mild ecchymosis\par -She is neurovascularly intact throughout the right arm [de-identified] : X-rays of the right shoulder were taken in the office today.  They include AP, ER, and axillary.  X-rays show nondisplaced proximal humerus fracture.  There is an anatomic neck fracture as well as a greater tuberosity fracture.  Overall alignment of the shoulder looks good the glenohumeral joint is reduced.  There is interval callus formation compared to last x-ray.  No significant displacement of the fracture fragments from previous x-rays.

## 2021-09-23 NOTE — ASSESSMENT
[FreeTextEntry1] : This is a 55-year-old woman 2.5 weeks out from  minimally displaced proximal humerus fracture treated nonoperatively.\par -Physical therapy\par -Tylenol and Motrin for pain with tramadol for breakthrough pain\par -Wean out of sling\par -All of the patient's questions and concerns were addressed during this visit

## 2021-10-26 ENCOUNTER — RX RENEWAL (OUTPATIENT)
Age: 56
End: 2021-10-26

## 2021-10-27 ENCOUNTER — RX RENEWAL (OUTPATIENT)
Age: 56
End: 2021-10-27

## 2021-11-08 ENCOUNTER — APPOINTMENT (OUTPATIENT)
Dept: INTERNAL MEDICINE | Facility: CLINIC | Age: 56
End: 2021-11-08
Payer: MEDICAID

## 2021-11-08 VITALS
HEIGHT: 65 IN | SYSTOLIC BLOOD PRESSURE: 111 MMHG | OXYGEN SATURATION: 96 % | TEMPERATURE: 97.1 F | HEART RATE: 120 BPM | DIASTOLIC BLOOD PRESSURE: 70 MMHG

## 2021-11-08 DIAGNOSIS — E61.1 IRON DEFICIENCY: ICD-10-CM

## 2021-11-08 PROCEDURE — 99213 OFFICE O/P EST LOW 20 MIN: CPT

## 2021-11-08 RX ORDER — NAPROXEN 500 MG/1
500 TABLET ORAL
Qty: 60 | Refills: 1 | Status: DISCONTINUED | COMMUNITY
Start: 2021-05-05 | End: 2021-11-08

## 2021-11-08 NOTE — HISTORY OF PRESENT ILLNESS
[FreeTextEntry1] : On Vivitrol shot to stop ETOH. she has some side effects.. she has a history of binge ETOH drinking. \par  last colon and EGD in 2020\par  h/o cyclic vomiting syndrome  from ETOH abuse. \par  Stopped Vivitrol- recently broke right shoulder may need surgery. \par still episodic vomiting when she drinks acididc foods or juices. \par  No more drinking. \par uses omeprazole 40mg irregularly

## 2021-11-08 NOTE — PHYSICAL EXAM
[General Appearance - Alert] : alert [General Appearance - In No Acute Distress] : in no acute distress [Sclera] : the sclera and conjunctiva were normal [PERRL With Normal Accommodation] : pupils were equal in size, round, and reactive to light [Extraocular Movements] : extraocular movements were intact [Outer Ear] : the ears and nose were normal in appearance [Oropharynx] : the oropharynx was normal [Neck Appearance] : the appearance of the neck was normal [Neck Cervical Mass (___cm)] : no neck mass was observed [Jugular Venous Distention Increased] : there was no jugular-venous distention [Thyroid Diffuse Enlargement] : the thyroid was not enlarged [Thyroid Nodule] : there were no palpable thyroid nodules [Auscultation Breath Sounds / Voice Sounds] : lungs were clear to auscultation bilaterally [Heart Rate And Rhythm] : heart rate was normal and rhythm regular [Heart Sounds] : normal S1 and S2 [Heart Sounds Gallop] : no gallops [Murmurs] : no murmurs [Heart Sounds Pericardial Friction Rub] : no pericardial rub [Full Pulse] : the pedal pulses are present [Edema] : there was no peripheral edema [Bowel Sounds] : normal bowel sounds [Abdomen Soft] : soft [Abdomen Tenderness] : non-tender [Abdomen Mass (___ Cm)] : no abdominal mass palpated [No CVA Tenderness] : no ~M costovertebral angle tenderness [No Spinal Tenderness] : no spinal tenderness [Skin Color & Pigmentation] : normal skin color and pigmentation [Skin Turgor] : normal skin turgor [] : no rash [Deep Tendon Reflexes (DTR)] : deep tendon reflexes were 2+ and symmetric [Sensation] : the sensory exam was normal to light touch and pinprick [No Focal Deficits] : no focal deficits [Oriented To Time, Place, And Person] : oriented to person, place, and time [Impaired Insight] : insight and judgment were intact [Affect] : the affect was normal

## 2021-11-11 ENCOUNTER — APPOINTMENT (OUTPATIENT)
Dept: ORTHOPEDIC SURGERY | Facility: CLINIC | Age: 56
End: 2021-11-11
Payer: MEDICAID

## 2021-11-11 PROCEDURE — 99213 OFFICE O/P EST LOW 20 MIN: CPT

## 2021-11-11 PROCEDURE — 73030 X-RAY EXAM OF SHOULDER: CPT | Mod: RT

## 2021-11-11 NOTE — PHYSICAL EXAM
[de-identified] : Patient is comfortable in exam room\par Right shoulder and arm\par -No pain with motion of the shoulder except for endpoints\par -Forward elevation 160 degrees, external rotation 15 , abduction 90 , internal rotation buttocks\par -Skin is intact\par -Some pain in the anterior aspect of the shoulder with resisted supination\par -She is neurovascularly intact throughout the right arm [de-identified] : X-rays of the right shoulder were taken in the office today.  They include AP, ER, and axillary.  X-rays show good overall alignment of the shoulder joint the glenohumeral joint is reduced.  There is interval callus at the fracture site.  There is no significant displacement of the fracture.

## 2021-11-11 NOTE — ASSESSMENT
[FreeTextEntry1] : This is a 55-year-old woman 2 months out from  minimally displaced proximal humerus fracture treated nonoperatively doing well.\par -Patient is a little frustrated that she is not making more progress, however, I had a long discussion with her explaining that she is actually doing very well and she has made significant progress from the last visit.\par -Continue home therapy\par -Mobic for pain, patient has not been taking anything\par -Follow-up in 2 months with x-rays of the right shoulder at that time\par -All of the patient's questions and concerns were addressed during this visit Self/Family

## 2021-11-11 NOTE — HISTORY OF PRESENT ILLNESS
[de-identified] : This is a 55-year-old right-hand-dominant woman here for follow-up and evaluation of her right shoulder fracture 9/6/2021, treated nonoperatively. Since her last visit she is feeling much better.  She still has some pain in her right shoulder when she sleeps and occasionally has popping and clicking.  She has been doing home exercises multiple times daily.  She denies any numbness or tingling in her right hand or arm.

## 2021-11-23 ENCOUNTER — RX RENEWAL (OUTPATIENT)
Age: 56
End: 2021-11-23

## 2021-11-23 RX ORDER — FOLIC ACID 1 MG/1
1 TABLET ORAL DAILY
Qty: 30 | Refills: 5 | Status: ACTIVE | COMMUNITY
Start: 2021-06-02 | End: 1900-01-01

## 2021-12-06 ENCOUNTER — RX RENEWAL (OUTPATIENT)
Age: 56
End: 2021-12-06

## 2022-01-06 ENCOUNTER — APPOINTMENT (OUTPATIENT)
Dept: ORTHOPEDIC SURGERY | Facility: CLINIC | Age: 57
End: 2022-01-06

## 2022-01-13 ENCOUNTER — APPOINTMENT (OUTPATIENT)
Dept: ORTHOPEDIC SURGERY | Facility: CLINIC | Age: 57
End: 2022-01-13
Payer: MEDICAID

## 2022-01-13 DIAGNOSIS — S42.201A UNSPECIFIED FRACTURE OF UPPER END OF RIGHT HUMERUS, INITIAL ENCOUNTER FOR CLOSED FRACTURE: ICD-10-CM

## 2022-01-13 PROCEDURE — 99214 OFFICE O/P EST MOD 30 MIN: CPT

## 2022-01-13 PROCEDURE — 73030 X-RAY EXAM OF SHOULDER: CPT | Mod: RT

## 2022-01-13 NOTE — HISTORY OF PRESENT ILLNESS
[de-identified] : This is a 56-year-old right-hand-dominant woman here for follow-up and evaluation of her right shoulder fracture 9/6/2021, treated nonoperatively. Since her last visit she is feeling much better. She still has some pain in her right shoulder when she sleeps and occasionally has popping and clicking.  She has not gone to physical therapy because there are not any locations close to her house.  She has been doing exercises on her own.  The shoulder bothers her most at this point with internal rotation.

## 2022-01-13 NOTE — DISCUSSION/SUMMARY
[de-identified] : This is a 56-year-old woman 4 months out from minimally displaced proximal humerus fracture treated nonoperatively doing well.\par -Continue home therapy, we encouraged her to participate in formal physical therapy to help her achieve further range of motion and comfort of use of her shoulder.  She will consider it.\par -She has not been taking Mobic that we prescribed last time.  I encouraged her to give it a try and see if it helps with her discomfort, especially with sleeping.\par -Follow-up in 2 months with x-rays of the right shoulder at that time\par -All of the patient's questions and concerns were addressed during this visit.

## 2022-01-13 NOTE — PHYSICAL EXAM
[de-identified] : Patient is comfortable in exam room\par Right shoulder and arm\par -No pain with motion of the shoulder except for endpoints\par -Forward elevation 170 degrees, external rotation 15 , abduction 90 , internal rotation L5\par -Skin is intact\par -Some pain in the anterior aspect of the shoulder with resisted supination\par -She is neurovascularly intact throughout the right arm  [de-identified] : X-rays of the right shoulder were taken in the office today. They include AP, ER, and axillary. X-rays show good overall alignment of the shoulder joint the glenohumeral joint is reduced. There is interval callus at the fracture site. There is no displacement of the fracture. \par \par

## 2022-01-19 ENCOUNTER — APPOINTMENT (OUTPATIENT)
Dept: FAMILY MEDICINE | Facility: CLINIC | Age: 57
End: 2022-01-19
Payer: MEDICAID

## 2022-01-19 VITALS
TEMPERATURE: 97.8 F | OXYGEN SATURATION: 98 % | WEIGHT: 165 LBS | BODY MASS INDEX: 27.49 KG/M2 | HEART RATE: 96 BPM | HEIGHT: 65 IN

## 2022-01-19 PROCEDURE — G0008: CPT

## 2022-01-19 PROCEDURE — 99214 OFFICE O/P EST MOD 30 MIN: CPT | Mod: 25

## 2022-01-19 PROCEDURE — 90686 IIV4 VACC NO PRSV 0.5 ML IM: CPT

## 2022-01-19 NOTE — PHYSICAL EXAM
[No Acute Distress] : no acute distress [Well Nourished] : well nourished [Normal Sclera/Conjunctiva] : normal sclera/conjunctiva [EOMI] : extraocular movements intact [Normal Outer Ear/Nose] : the outer ears and nose were normal in appearance [No JVD] : no jugular venous distention [Normal] : normal rate, regular rhythm, normal S1 and S2 and no murmur heard [Coordination Grossly Intact] : coordination grossly intact [Normal Affect] : the affect was normal [Alert and Oriented x3] : oriented to person, place, and time [Normal Insight/Judgement] : insight and judgment were intact [de-identified] : HR-90's [de-identified] : sad, crying

## 2022-01-19 NOTE — PLAN
[FreeTextEntry1] : allowed to vent, emotional support provided\par restart phentermine at 15 mg, monitor HR\par rtc in 1 mth

## 2022-02-02 ENCOUNTER — APPOINTMENT (OUTPATIENT)
Dept: RHEUMATOLOGY | Facility: CLINIC | Age: 57
End: 2022-02-02

## 2022-02-06 ENCOUNTER — RX RENEWAL (OUTPATIENT)
Age: 57
End: 2022-02-06

## 2022-02-07 ENCOUNTER — RX RENEWAL (OUTPATIENT)
Age: 57
End: 2022-02-07

## 2022-02-09 ENCOUNTER — APPOINTMENT (OUTPATIENT)
Dept: FAMILY MEDICINE | Facility: CLINIC | Age: 57
End: 2022-02-09
Payer: MEDICAID

## 2022-02-09 VITALS
DIASTOLIC BLOOD PRESSURE: 60 MMHG | HEIGHT: 65 IN | SYSTOLIC BLOOD PRESSURE: 100 MMHG | TEMPERATURE: 96.5 F | BODY MASS INDEX: 26.82 KG/M2 | HEART RATE: 87 BPM | OXYGEN SATURATION: 98 % | WEIGHT: 161 LBS

## 2022-02-09 DIAGNOSIS — Z86.79 PERSONAL HISTORY OF OTHER DISEASES OF THE CIRCULATORY SYSTEM: ICD-10-CM

## 2022-02-09 PROCEDURE — 99215 OFFICE O/P EST HI 40 MIN: CPT

## 2022-02-09 RX ORDER — LORATADINE 10 MG/1
10 TABLET ORAL
Qty: 90 | Refills: 3 | Status: DISCONTINUED | COMMUNITY
Start: 2021-04-07 | End: 2022-02-09

## 2022-02-09 RX ORDER — PYRIDOXINE HCL (VITAMIN B6) 50 MG
50 TABLET ORAL DAILY
Qty: 100 | Refills: 2 | Status: ACTIVE | COMMUNITY
Start: 2021-06-02 | End: 1900-01-01

## 2022-02-09 NOTE — PLAN
[FreeTextEntry1] : f/u w/ GI, possible esophagits, ulcer due to hx of extensive vomiting and etoh abuse\par consider cardio f/u for mvp\par advised against water pill, bp already low, no edema\par allowed to vent, emotional support provided\par rtc in 1 mth

## 2022-02-09 NOTE — PHYSICAL EXAM
[No Acute Distress] : no acute distress [Well Nourished] : well nourished [Normal Sclera/Conjunctiva] : normal sclera/conjunctiva [EOMI] : extraocular movements intact [Normal Outer Ear/Nose] : the outer ears and nose were normal in appearance [No JVD] : no jugular venous distention [Normal] : normal rate, regular rhythm, normal S1 and S2 and no murmur heard [Soft] : abdomen soft [Non Tender] : non-tender [Non-distended] : non-distended [No HSM] : no HSM [Normal Bowel Sounds] : normal bowel sounds [Coordination Grossly Intact] : coordination grossly intact [Normal Affect] : the affect was normal [Alert and Oriented x3] : oriented to person, place, and time [Normal Insight/Judgement] : insight and judgment were intact [Normal Gait] : normal gait [de-identified] : anxious

## 2022-02-09 NOTE — HISTORY OF PRESENT ILLNESS
[FreeTextEntry1] : last wk c/o chest tightness, waking her up in the middle of the night; heart racing; hx of MVP; not sure if it was anxiety; she couldn't eat, or able to drink water,; was not taking phentermine; has been sober, not drinking etoh; denies wheezing or sob\par felt she got sick producing phlegm, symptoms went on for  4 d;  tested covid neg x 2; feels fine today\par seeing therapist and attending group\par still concerned about wgt\par wants to continue vivitrol\par also needs vitamin refills; asking for water pill to lose wgt

## 2022-02-19 ENCOUNTER — RX RENEWAL (OUTPATIENT)
Age: 57
End: 2022-02-19

## 2022-03-03 ENCOUNTER — RX RENEWAL (OUTPATIENT)
Age: 57
End: 2022-03-03

## 2022-03-07 ENCOUNTER — RX RENEWAL (OUTPATIENT)
Age: 57
End: 2022-03-07

## 2022-03-09 ENCOUNTER — APPOINTMENT (OUTPATIENT)
Dept: FAMILY MEDICINE | Facility: CLINIC | Age: 57
End: 2022-03-09
Payer: MEDICAID

## 2022-03-09 VITALS — OXYGEN SATURATION: 98 % | HEART RATE: 136 BPM | HEIGHT: 65 IN | TEMPERATURE: 98.6 F

## 2022-03-09 VITALS — DIASTOLIC BLOOD PRESSURE: 60 MMHG | SYSTOLIC BLOOD PRESSURE: 110 MMHG | WEIGHT: 167 LBS | BODY MASS INDEX: 27.79 KG/M2

## 2022-03-09 DIAGNOSIS — R41.840 ATTENTION AND CONCENTRATION DEFICIT: ICD-10-CM

## 2022-03-09 PROCEDURE — 99214 OFFICE O/P EST MOD 30 MIN: CPT | Mod: 25

## 2022-03-09 RX ORDER — LORATADINE 10 MG/1
10 TABLET ORAL
Qty: 60 | Refills: 3 | Status: ACTIVE | COMMUNITY
Start: 2022-03-09 | End: 1900-01-01

## 2022-03-09 RX ORDER — VITAMIN E (DL,TOCOPHERYL ACET) 180 MG
CAPSULE ORAL
Refills: 0 | Status: ACTIVE | COMMUNITY
Start: 2022-03-09

## 2022-03-09 RX ORDER — B-COMPLEX WITH VITAMIN C
TABLET ORAL DAILY
Qty: 100 | Refills: 1 | Status: DISCONTINUED | COMMUNITY
Start: 2021-06-02 | End: 2022-03-09

## 2022-03-09 RX ORDER — NALTREXONE 380 MG
380 KIT INTRAMUSCULAR
Qty: 1 | Refills: 5 | Status: DISCONTINUED | COMMUNITY
Start: 2021-06-02 | End: 2022-03-09

## 2022-03-09 RX ORDER — AMOXICILLIN 500 MG
CAPSULE ORAL
Refills: 0 | Status: ACTIVE | COMMUNITY
Start: 2022-03-09

## 2022-03-09 RX ORDER — TRAMADOL HYDROCHLORIDE 50 MG/1
50 TABLET, COATED ORAL
Qty: 30 | Refills: 0 | Status: DISCONTINUED | COMMUNITY
Start: 2021-09-23 | End: 2022-03-09

## 2022-03-09 NOTE — PHYSICAL EXAM
[No Acute Distress] : no acute distress [Well Nourished] : well nourished [Normal Sclera/Conjunctiva] : normal sclera/conjunctiva [EOMI] : extraocular movements intact [Normal Outer Ear/Nose] : the outer ears and nose were normal in appearance [Normal] : normal rate, regular rhythm, normal S1 and S2 and no murmur heard [Coordination Grossly Intact] : coordination grossly intact [Normal Gait] : normal gait [Normal Affect] : the affect was normal [Alert and Oriented x3] : oriented to person, place, and time [Normal Insight/Judgement] : insight and judgment were intact

## 2022-03-09 NOTE — HISTORY OF PRESENT ILLNESS
[FreeTextEntry1] : here for f/u; 100 days sober, no drinking, deferring vivitrol\par c/o gaining wgt, not motivated, unable to clean home, wash dishes, did not like psychiatrist she last met with\par c/o constant runny nose when eating, has not taken any allergy meds, would like to see ENT\par c/o pain near coccyx when she sits as if she had a mass, but doesn't feel it on the outside

## 2022-03-09 NOTE — PLAN
[FreeTextEntry1] : cont rx\par cont etoh abstinence\par deferring vivitrol at this time\par allowed to vent, emotional support provided\par refilled rx

## 2022-03-16 ENCOUNTER — APPOINTMENT (OUTPATIENT)
Dept: FAMILY MEDICINE | Facility: CLINIC | Age: 57
End: 2022-03-16
Payer: MEDICAID

## 2022-03-16 DIAGNOSIS — R51.9 HEADACHE, UNSPECIFIED: ICD-10-CM

## 2022-03-16 DIAGNOSIS — R09.89 OTHER SPECIFIED SYMPTOMS AND SIGNS INVOLVING THE CIRCULATORY AND RESPIRATORY SYSTEMS: ICD-10-CM

## 2022-03-16 PROCEDURE — 99442: CPT

## 2022-03-16 NOTE — PLAN
[FreeTextEntry1] : advised to get covid tested\par also suggested rsv panel at lab\par rest, tylenol, hydrate\par pt will cb

## 2022-03-16 NOTE — HISTORY OF PRESENT ILLNESS
[Home] : at home, [unfilled] , at the time of the visit. [Medical Office: ([facility])___] : at the medical office located in  [Verbal consent obtained from patient] : the patient, [unfilled] [FreeTextEntry8] : pt w/ terrible headache past several days, sinus congestion, felt she couldn't walk because of the wgt to her legs\par she was able to go to group today\par thinking she may have sinus infection\par has not been tested for covid yet

## 2022-03-24 ENCOUNTER — APPOINTMENT (OUTPATIENT)
Dept: ORTHOPEDIC SURGERY | Facility: CLINIC | Age: 57
End: 2022-03-24

## 2022-04-04 ENCOUNTER — RX RENEWAL (OUTPATIENT)
Age: 57
End: 2022-04-04

## 2022-05-09 ENCOUNTER — RX RENEWAL (OUTPATIENT)
Age: 57
End: 2022-05-09

## 2022-06-06 ENCOUNTER — RX RENEWAL (OUTPATIENT)
Age: 57
End: 2022-06-06

## 2022-06-29 NOTE — ED PROVIDER NOTE - PHYSICAL EXAMINATION
Gen: Alert, Well appearing. NAD  , mild tremors in hands with movement  Head: NC, AT, PERRL, normal lids/conjunctiva   ENT: patent oropharynx without erythema/exudate, uvula midline, + dry mm, poor skin turgor  Neck: supple, no tenderness/meningismus  Pulm: Bilateral clear BS, normal resp effort  CV: RRR, no M/R/G, +dist pulses   Abd: soft, NT/ND, +BS, no guarding/rebound tenderness  Mskel: no edema/erythema/cyanosis   Skin: no rash, no bruising  Neuro: AAOx3, no sensory/motor deficits, CN 2-12 intact Quality 226: Preventive Care And Screening: Tobacco Use: Screening And Cessation Intervention: Patient screened for tobacco use and is an ex/non-smoker Detail Level: Detailed Quality 130: Documentation Of Current Medications In The Medical Record: Current Medications Documented Quality 431: Preventive Care And Screening: Unhealthy Alcohol Use - Screening: Patient screened for unhealthy alcohol use using a single question and scores less than 2 times per year Quality 110: Preventive Care And Screening: Influenza Immunization: Influenza immunization was not ordered or administered, reason not given

## 2022-07-11 ENCOUNTER — RX RENEWAL (OUTPATIENT)
Age: 57
End: 2022-07-11

## 2022-08-09 ENCOUNTER — RX RENEWAL (OUTPATIENT)
Age: 57
End: 2022-08-09

## 2022-08-31 ENCOUNTER — NON-APPOINTMENT (OUTPATIENT)
Age: 57
End: 2022-08-31

## 2022-08-31 ENCOUNTER — APPOINTMENT (OUTPATIENT)
Dept: FAMILY MEDICINE | Facility: CLINIC | Age: 57
End: 2022-08-31

## 2022-08-31 ENCOUNTER — LABORATORY RESULT (OUTPATIENT)
Age: 57
End: 2022-08-31

## 2022-08-31 VITALS
WEIGHT: 160 LBS | HEART RATE: 82 BPM | TEMPERATURE: 98 F | HEIGHT: 65 IN | BODY MASS INDEX: 26.66 KG/M2 | SYSTOLIC BLOOD PRESSURE: 123 MMHG | DIASTOLIC BLOOD PRESSURE: 77 MMHG | OXYGEN SATURATION: 98 % | RESPIRATION RATE: 18 BRPM

## 2022-08-31 DIAGNOSIS — E55.9 VITAMIN D DEFICIENCY, UNSPECIFIED: ICD-10-CM

## 2022-08-31 DIAGNOSIS — Z00.00 ENCOUNTER FOR GENERAL ADULT MEDICAL EXAMINATION W/OUT ABNORMAL FINDINGS: ICD-10-CM

## 2022-08-31 DIAGNOSIS — F32.9 MAJOR DEPRESSIVE DISORDER, SINGLE EPISODE, UNSPECIFIED: ICD-10-CM

## 2022-08-31 DIAGNOSIS — R92.8 OTHER ABNORMAL AND INCONCLUSIVE FINDINGS ON DIAGNOSTIC IMAGING OF BREAST: ICD-10-CM

## 2022-08-31 DIAGNOSIS — Z86.19 PERSONAL HISTORY OF OTHER INFECTIOUS AND PARASITIC DISEASES: ICD-10-CM

## 2022-08-31 DIAGNOSIS — N63.20 UNSPECIFIED LUMP IN THE LEFT BREAST, UNSPECIFIED QUADRANT: ICD-10-CM

## 2022-08-31 LAB
BASOPHILS # BLD AUTO: 0.09 K/UL
BASOPHILS NFR BLD AUTO: 1.5 %
EOSINOPHIL # BLD AUTO: 0.07 K/UL
EOSINOPHIL NFR BLD AUTO: 1.2 %
HCT VFR BLD CALC: 43.2 %
HGB BLD-MCNC: 14 G/DL
IMM GRANULOCYTES NFR BLD AUTO: 0.2 %
LYMPHOCYTES # BLD AUTO: 2.04 K/UL
LYMPHOCYTES NFR BLD AUTO: 34 %
MAN DIFF?: NORMAL
MCHC RBC-ENTMCNC: 31.7 PG
MCHC RBC-ENTMCNC: 32.4 GM/DL
MCV RBC AUTO: 98 FL
MONOCYTES # BLD AUTO: 0.49 K/UL
MONOCYTES NFR BLD AUTO: 8.2 %
NEUTROPHILS # BLD AUTO: 3.3 K/UL
NEUTROPHILS NFR BLD AUTO: 54.9 %
PLATELET # BLD AUTO: 288 K/UL
RBC # BLD: 4.41 M/UL
RBC # FLD: 13.4 %
WBC # FLD AUTO: 6 K/UL

## 2022-08-31 PROCEDURE — 93000 ELECTROCARDIOGRAM COMPLETE: CPT

## 2022-08-31 PROCEDURE — 36415 COLL VENOUS BLD VENIPUNCTURE: CPT

## 2022-08-31 PROCEDURE — 99396 PREV VISIT EST AGE 40-64: CPT | Mod: 25

## 2022-08-31 RX ORDER — ESCITALOPRAM OXALATE 20 MG/1
20 TABLET ORAL DAILY
Qty: 30 | Refills: 2 | Status: DISCONTINUED | COMMUNITY
Start: 2019-01-14 | End: 2022-08-31

## 2022-08-31 RX ORDER — LINACLOTIDE 72 UG/1
72 CAPSULE, GELATIN COATED ORAL
Qty: 90 | Refills: 3 | Status: DISCONTINUED | COMMUNITY
Start: 2021-11-08 | End: 2022-08-31

## 2022-08-31 RX ORDER — QUETIAPINE FUMARATE 200 MG/1
200 TABLET ORAL
Qty: 30 | Refills: 3 | Status: DISCONTINUED | COMMUNITY
Start: 2020-07-06 | End: 2022-08-31

## 2022-08-31 RX ORDER — CLONAZEPAM 0.5 MG/1
0.5 TABLET ORAL
Qty: 21 | Refills: 0 | Status: DISCONTINUED | COMMUNITY
Start: 2018-01-17 | End: 2022-08-31

## 2022-08-31 RX ORDER — BUPROPION HYDROCHLORIDE 150 MG/1
150 TABLET, EXTENDED RELEASE ORAL
Qty: 30 | Refills: 3 | Status: DISCONTINUED | COMMUNITY
Start: 2020-10-21 | End: 2022-08-31

## 2022-08-31 RX ORDER — ONDANSETRON 4 MG/1
4 TABLET, ORALLY DISINTEGRATING ORAL
Qty: 14 | Refills: 0 | Status: DISCONTINUED | COMMUNITY
Start: 2019-11-22 | End: 2022-08-31

## 2022-08-31 RX ORDER — TIZANIDINE 2 MG/1
2 TABLET ORAL
Qty: 30 | Refills: 1 | Status: DISCONTINUED | COMMUNITY
Start: 2021-05-24 | End: 2022-08-31

## 2022-08-31 NOTE — HEALTH RISK ASSESSMENT
[Fair] :  ~his/her~ mood as fair [No] : In the past 12 months have you used drugs other than those required for medical reasons? No [de-identified] : former [de-identified] : walking [de-identified] : eating more, ice cream

## 2022-08-31 NOTE — HISTORY OF PRESENT ILLNESS
[FreeTextEntry1] : 56 year old female who presents today for a complete physical exam.\par not drinking; still goes to AA, speaks to her counselor regularly\par excited about upcoming grandbaby in March\par started taking "[employer]" products, helping her stay regular; reading "[employer]", helping stay on track\par stopped most of her psych meds\par just restarted buspar due to recent abnormal mammogram, going for biopsy on 9/12\par

## 2022-09-01 ENCOUNTER — APPOINTMENT (OUTPATIENT)
Dept: INTERNAL MEDICINE | Facility: CLINIC | Age: 57
End: 2022-09-01

## 2022-09-01 VITALS
HEIGHT: 65 IN | TEMPERATURE: 98 F | WEIGHT: 160 LBS | HEART RATE: 110 BPM | BODY MASS INDEX: 26.66 KG/M2 | OXYGEN SATURATION: 98 % | SYSTOLIC BLOOD PRESSURE: 115 MMHG | DIASTOLIC BLOOD PRESSURE: 74 MMHG

## 2022-09-01 DIAGNOSIS — K29.70 GASTRITIS, UNSPECIFIED, W/OUT BLEEDING: ICD-10-CM

## 2022-09-01 DIAGNOSIS — K21.9 GASTRO-ESOPHAGEAL REFLUX DISEASE W/OUT ESOPHAGITIS: ICD-10-CM

## 2022-09-01 DIAGNOSIS — R12 HEARTBURN: ICD-10-CM

## 2022-09-01 DIAGNOSIS — K58.1 IRRITABLE BOWEL SYNDROME WITH CONSTIPATION: ICD-10-CM

## 2022-09-01 DIAGNOSIS — R11.15 CYCLICAL VOMITING SYNDROME UNRELATED TO MIGRAINE: ICD-10-CM

## 2022-09-01 LAB
25(OH)D3 SERPL-MCNC: 48.1 NG/ML
ALBUMIN SERPL ELPH-MCNC: 4.8 G/DL
ALP BLD-CCNC: 76 U/L
ALT SERPL-CCNC: 14 U/L
ANION GAP SERPL CALC-SCNC: 14 MMOL/L
APPEARANCE: CLEAR
AST SERPL-CCNC: 18 U/L
BILIRUB SERPL-MCNC: 0.2 MG/DL
BILIRUBIN URINE: NEGATIVE
BLOOD URINE: NEGATIVE
BUN SERPL-MCNC: 10 MG/DL
CALCIUM SERPL-MCNC: 10 MG/DL
CHLORIDE SERPL-SCNC: 102 MMOL/L
CHOLEST SERPL-MCNC: 252 MG/DL
CO2 SERPL-SCNC: 23 MMOL/L
COLOR: YELLOW
CREAT SERPL-MCNC: 0.68 MG/DL
EGFR: 103 ML/MIN/1.73M2
ESTIMATED AVERAGE GLUCOSE: 105 MG/DL
GLUCOSE QUALITATIVE U: NEGATIVE
GLUCOSE SERPL-MCNC: 98 MG/DL
HBA1C MFR BLD HPLC: 5.3 %
HDLC SERPL-MCNC: 81 MG/DL
KETONES URINE: NEGATIVE
LDLC SERPL CALC-MCNC: 112 MG/DL
LEUKOCYTE ESTERASE URINE: NEGATIVE
NITRITE URINE: NEGATIVE
NONHDLC SERPL-MCNC: 171 MG/DL
PH URINE: 5.5
POTASSIUM SERPL-SCNC: 4.6 MMOL/L
PROT SERPL-MCNC: 7.2 G/DL
PROTEIN URINE: NEGATIVE
SODIUM SERPL-SCNC: 139 MMOL/L
SPECIFIC GRAVITY URINE: 1.02
TRIGL SERPL-MCNC: 293 MG/DL
TSH SERPL-ACNC: 3.06 UIU/ML
UROBILINOGEN URINE: NORMAL

## 2022-09-01 PROCEDURE — 99213 OFFICE O/P EST LOW 20 MIN: CPT

## 2022-09-01 NOTE — PHYSICAL EXAM
[General Appearance - Alert] : alert [General Appearance - In No Acute Distress] : in no acute distress [Sclera] : the sclera and conjunctiva were normal [PERRL With Normal Accommodation] : pupils were equal in size, round, and reactive to light [Extraocular Movements] : extraocular movements were intact [Outer Ear] : the ears and nose were normal in appearance [Oropharynx] : the oropharynx was normal [Neck Appearance] : the appearance of the neck was normal [Neck Cervical Mass (___cm)] : no neck mass was observed [Jugular Venous Distention Increased] : there was no jugular-venous distention [Thyroid Diffuse Enlargement] : the thyroid was not enlarged [Thyroid Nodule] : there were no palpable thyroid nodules [Auscultation Breath Sounds / Voice Sounds] : lungs were clear to auscultation bilaterally [Heart Rate And Rhythm] : heart rate was normal and rhythm regular [Heart Sounds] : normal S1 and S2 [Heart Sounds Gallop] : no gallops [Murmurs] : no murmurs [Heart Sounds Pericardial Friction Rub] : no pericardial rub [Full Pulse] : the pedal pulses are present [Edema] : there was no peripheral edema [Bowel Sounds] : normal bowel sounds [Abdomen Soft] : soft [Abdomen Tenderness] : non-tender [Abdomen Mass (___ Cm)] : no abdominal mass palpated [Normal Sphincter Tone] : normal sphincter tone [No Rectal Mass] : no rectal mass [Occult Blood Positive] : stool was negative for occult blood [Abnormal Walk] : normal gait [Nail Clubbing] : no clubbing  or cyanosis of the fingernails [Musculoskeletal - Swelling] : no joint swelling seen [Motor Tone] : muscle strength and tone were normal [Skin Color & Pigmentation] : normal skin color and pigmentation [Skin Turgor] : normal skin turgor [] : no rash [Deep Tendon Reflexes (DTR)] : deep tendon reflexes were 2+ and symmetric [Sensation] : the sensory exam was normal to light touch and pinprick [No Focal Deficits] : no focal deficits [Oriented To Time, Place, And Person] : oriented to person, place, and time [Impaired Insight] : insight and judgment were intact [Affect] : the affect was normal

## 2022-09-01 NOTE — HISTORY OF PRESENT ILLNESS
[FreeTextEntry1] : Had diarrhea for 6 weeks - felt blockage the diarrhea . now back to normal for 1 week. Using Nubiome 1 packet daily. Her GERD was acting up and now feels better. She is no longer drinking. recent mammography has mass in her left breast\par  last EGD and colon 2/2020

## 2022-09-08 ENCOUNTER — RX RENEWAL (OUTPATIENT)
Age: 57
End: 2022-09-08

## 2022-10-07 ENCOUNTER — RX RENEWAL (OUTPATIENT)
Age: 57
End: 2022-10-07

## 2022-11-22 ENCOUNTER — APPOINTMENT (OUTPATIENT)
Dept: FAMILY MEDICINE | Facility: CLINIC | Age: 57
End: 2022-11-22

## 2023-01-11 ENCOUNTER — RX RENEWAL (OUTPATIENT)
Age: 58
End: 2023-01-11

## 2023-02-08 ENCOUNTER — RX RENEWAL (OUTPATIENT)
Age: 58
End: 2023-02-08

## 2023-03-02 ENCOUNTER — APPOINTMENT (OUTPATIENT)
Dept: FAMILY MEDICINE | Facility: CLINIC | Age: 58
End: 2023-03-02
Payer: MEDICAID

## 2023-03-02 VITALS
DIASTOLIC BLOOD PRESSURE: 82 MMHG | HEIGHT: 65 IN | TEMPERATURE: 98 F | WEIGHT: 171 LBS | OXYGEN SATURATION: 98 % | HEART RATE: 93 BPM | SYSTOLIC BLOOD PRESSURE: 124 MMHG | BODY MASS INDEX: 28.49 KG/M2

## 2023-03-02 DIAGNOSIS — F43.0 ACUTE STRESS REACTION: ICD-10-CM

## 2023-03-02 DIAGNOSIS — F43.10 POST-TRAUMATIC STRESS DISORDER, UNSPECIFIED: ICD-10-CM

## 2023-03-02 DIAGNOSIS — F10.20 ALCOHOL DEPENDENCE, UNCOMPLICATED: ICD-10-CM

## 2023-03-02 DIAGNOSIS — S02.5XXA FRACTURE OF TOOTH (TRAUMATIC), INITIAL ENCOUNTER FOR CLOSED FRACTURE: ICD-10-CM

## 2023-03-02 DIAGNOSIS — E78.1 PURE HYPERGLYCERIDEMIA: ICD-10-CM

## 2023-03-02 DIAGNOSIS — Z92.29 PERSONAL HISTORY OF OTHER DRUG THERAPY: ICD-10-CM

## 2023-03-02 PROCEDURE — 99214 OFFICE O/P EST MOD 30 MIN: CPT

## 2023-03-02 RX ORDER — GLUCOSAMINE/MSM/CHONDROIT SULF 500-166.6
1000 TABLET ORAL
Refills: 0 | Status: ACTIVE | COMMUNITY
Start: 2023-03-02

## 2023-03-02 RX ORDER — MELOXICAM 7.5 MG/1
7.5 TABLET ORAL DAILY
Qty: 30 | Refills: 2 | Status: DISCONTINUED | COMMUNITY
Start: 2022-01-13 | End: 2023-03-02

## 2023-03-02 NOTE — HISTORY OF PRESENT ILLNESS
[FreeTextEntry1] : here for follow up\par c/o anxiety attack, worried about losing her benefits if she starts working,  has advised her to start looking for work, pt states will not help her pay her bills\par just started on buspar qd, trouble w/ sleeping\par states  asking for letter to not work x 6 mths\par follows w/ therapist on fridays and group is on tuesdays\par no longer drinking etoh\par c/o chronic joint pain\par c/o hx of PTSD\par also states hx of abn mammo, followed by biopsy- benign, to f/u later this mth

## 2023-03-02 NOTE — PHYSICAL EXAM
[No Acute Distress] : no acute distress [Normal Sclera/Conjunctiva] : normal sclera/conjunctiva [EOMI] : extraocular movements intact [Normal Outer Ear/Nose] : the outer ears and nose were normal in appearance [Normal] : normal rate, regular rhythm, normal S1 and S2 and no murmur heard [Coordination Grossly Intact] : coordination grossly intact [Alert and Oriented x3] : oriented to person, place, and time [Normal Insight/Judgement] : insight and judgment were intact [de-identified] : anxious

## 2023-03-03 ENCOUNTER — APPOINTMENT (OUTPATIENT)
Dept: RHEUMATOLOGY | Facility: CLINIC | Age: 58
End: 2023-03-03
Payer: MEDICAID

## 2023-03-03 VITALS
HEART RATE: 110 BPM | BODY MASS INDEX: 28.49 KG/M2 | HEIGHT: 65 IN | DIASTOLIC BLOOD PRESSURE: 79 MMHG | SYSTOLIC BLOOD PRESSURE: 124 MMHG | OXYGEN SATURATION: 98 % | WEIGHT: 171 LBS

## 2023-03-03 LAB
BASOPHILS # BLD AUTO: 0.05 K/UL
BASOPHILS NFR BLD AUTO: 1 %
EOSINOPHIL # BLD AUTO: 0.09 K/UL
EOSINOPHIL NFR BLD AUTO: 1.9 %
HCT VFR BLD CALC: 38.4 %
HGB BLD-MCNC: 12.4 G/DL
IMM GRANULOCYTES NFR BLD AUTO: 0.2 %
LYMPHOCYTES # BLD AUTO: 1.79 K/UL
LYMPHOCYTES NFR BLD AUTO: 37.4 %
MAN DIFF?: NORMAL
MCHC RBC-ENTMCNC: 30.8 PG
MCHC RBC-ENTMCNC: 32.3 GM/DL
MCV RBC AUTO: 95.5 FL
MONOCYTES # BLD AUTO: 0.51 K/UL
MONOCYTES NFR BLD AUTO: 10.6 %
NEUTROPHILS # BLD AUTO: 2.34 K/UL
NEUTROPHILS NFR BLD AUTO: 48.9 %
PLATELET # BLD AUTO: 255 K/UL
RBC # BLD: 4.02 M/UL
RBC # FLD: 13.1 %
WBC # FLD AUTO: 4.79 K/UL

## 2023-03-03 PROCEDURE — 99215 OFFICE O/P EST HI 40 MIN: CPT

## 2023-03-03 NOTE — HISTORY OF PRESENT ILLNESS
[Fatigue] : fatigue [Arthralgias] : arthralgias [Joint Swelling] : joint swelling [Morning Stiffness] : morning stiffness [FreeTextEntry1] : Pt reports that for the past month, she has been experiencing diffuse pain, including joints and muscles.  (+)stiffness, yovany after prolonged inactivity.  The pain is constant, both with activity and at rest.  No swelling or erythema.  Also w/ persistent fatigue.  Her symptoms all worsened after a panic attack about 1 month ago. [Anorexia] : no anorexia [Weight Loss] : no weight loss [Malaise] : no malaise [Fever] : no fever [Chills] : no chills [Malar Facial Rash] : no malar facial rash [Skin Lesions] : no lesions [Skin Nodules] : no skin nodules [Oral Ulcers] : no oral ulcers [Cough] : no cough [Dry Mouth] : no dry mouth [Dysphonia] : no dysphonia [Dysphagia] : no dysphagia [Shortness of Breath] : no shortness of breath [Chest Pain] : no chest pain [Joint Warmth] : no joint warmth [Joint Deformity] : no joint deformity [Decreased ROM] : no decreased range of motion [Falls] : no falls [Dyspnea] : no dyspnea [Myalgias] : no myalgias [Muscle Weakness] : no muscle weakness [Muscle Spasms] : no muscle spasms [Muscle Cramping] : no muscle cramping [Visual Changes] : no visual changes [Eye Pain] : no eye pain [Eye Redness] : no eye redness [Dry Eyes] : no dry eyes

## 2023-03-03 NOTE — ASSESSMENT
[FreeTextEntry1] : 57 year old female with:\par 1)  Polyarticular joint pains:  While pt was found to have a weakly positive CCP and elevated ESR (though normal CRP), her presentation is most c/w non-inflammatory arthritis, yovany given lack of improvement on prednisone.\par   - Reiterated importance of exercise\par   - Cont naproxen 500mg BID prn and/or Tylenol prn\par   - warm compresses\par   - OTC topical analgesics\par 2)  Neck pain:  x-rays revealed OA/DDD:  improved.\par   - Reiterated importance of neck exercises\par   - analgesia prn as above\par 3) B/L shoulder pain:  most c/w RTC tendinopathy.  much improved s/p corticosteroid injection to right shoulder at last visit.\par   - Cont shoulder exercises\par   - analgesia as above\par 4)  Low back pain / hip pain:  no red flag symptoms.  Currently much improved.\par   - Back exercises\par   - analgesia as above\par 5)  (+)NICOLE:  no obvious si/sx of underlying CTD and all sub-serologies negative\par   - Cont to monitor.\par 6)  Diffuse pain, persistent fatigue:  most c/w fibromyalgia though this is a diagnosis iof exclusion\par   - Check labs - r/o other possible etiologies.\par   - Recommended exercise, yovany stretching, light aerobics, yoga, aquatic

## 2023-03-05 ENCOUNTER — APPOINTMENT (OUTPATIENT)
Dept: FAMILY MEDICINE | Facility: CLINIC | Age: 58
End: 2023-03-05

## 2023-03-06 LAB
ALBUMIN SERPL ELPH-MCNC: 4.7 G/DL
ALP BLD-CCNC: 78 U/L
ALT SERPL-CCNC: 13 U/L
ANION GAP SERPL CALC-SCNC: 14 MMOL/L
AST SERPL-CCNC: 27 U/L
BILIRUB SERPL-MCNC: 0.3 MG/DL
BUN SERPL-MCNC: 19 MG/DL
C3 SERPL-MCNC: 104 MG/DL
C4 SERPL-MCNC: 20 MG/DL
CALCIUM SERPL-MCNC: 10.2 MG/DL
CHLORIDE SERPL-SCNC: 101 MMOL/L
CO2 SERPL-SCNC: 23 MMOL/L
CREAT SERPL-MCNC: 0.69 MG/DL
CRP SERPL-MCNC: <3 MG/L
DSDNA AB SER-ACNC: <12 IU/ML
EGFR: 102 ML/MIN/1.73M2
ENA RNP AB SER IA-ACNC: <0.2 AL
ENA SM AB SER IA-ACNC: <0.2 AL
ENA SS-A AB SER IA-ACNC: <0.2 AL
ENA SS-B AB SER IA-ACNC: <0.2 AL
ERYTHROCYTE [SEDIMENTATION RATE] IN BLOOD BY WESTERGREN METHOD: 31 MM/HR
GLUCOSE SERPL-MCNC: 83 MG/DL
POTASSIUM SERPL-SCNC: 4.8 MMOL/L
PROT SERPL-MCNC: 7.3 G/DL
SODIUM SERPL-SCNC: 138 MMOL/L
THYROGLOB AB SERPL-ACNC: <20 IU/ML
THYROPEROXIDASE AB SERPL IA-ACNC: <10 IU/ML
TSH SERPL-ACNC: 2.57 UIU/ML

## 2023-03-08 LAB
ANA PAT FLD IF-IMP: ABNORMAL
ANA SER IF-ACNC: ABNORMAL

## 2023-03-13 ENCOUNTER — RX RENEWAL (OUTPATIENT)
Age: 58
End: 2023-03-13

## 2023-04-01 ENCOUNTER — RX RENEWAL (OUTPATIENT)
Age: 58
End: 2023-04-01

## 2023-04-13 ENCOUNTER — APPOINTMENT (OUTPATIENT)
Dept: CARDIOLOGY | Facility: CLINIC | Age: 58
End: 2023-04-13
Payer: MEDICAID

## 2023-04-13 VITALS
OXYGEN SATURATION: 96 % | SYSTOLIC BLOOD PRESSURE: 121 MMHG | WEIGHT: 172 LBS | DIASTOLIC BLOOD PRESSURE: 81 MMHG | BODY MASS INDEX: 28.66 KG/M2 | HEART RATE: 106 BPM | HEIGHT: 65 IN

## 2023-04-13 DIAGNOSIS — R07.89 OTHER CHEST PAIN: ICD-10-CM

## 2023-04-13 DIAGNOSIS — R06.09 OTHER FORMS OF DYSPNEA: ICD-10-CM

## 2023-04-13 PROCEDURE — 93000 ELECTROCARDIOGRAM COMPLETE: CPT

## 2023-04-13 PROCEDURE — 99204 OFFICE O/P NEW MOD 45 MIN: CPT | Mod: 25

## 2023-04-20 ENCOUNTER — APPOINTMENT (OUTPATIENT)
Dept: FAMILY MEDICINE | Facility: CLINIC | Age: 58
End: 2023-04-20
Payer: MEDICAID

## 2023-04-20 ENCOUNTER — APPOINTMENT (OUTPATIENT)
Dept: INTERNAL MEDICINE | Facility: CLINIC | Age: 58
End: 2023-04-20

## 2023-04-20 VITALS
OXYGEN SATURATION: 98 % | WEIGHT: 175 LBS | HEIGHT: 65 IN | BODY MASS INDEX: 29.16 KG/M2 | DIASTOLIC BLOOD PRESSURE: 78 MMHG | RESPIRATION RATE: 16 BRPM | SYSTOLIC BLOOD PRESSURE: 122 MMHG | HEART RATE: 68 BPM

## 2023-04-20 DIAGNOSIS — M13.0 POLYARTHRITIS, UNSPECIFIED: ICD-10-CM

## 2023-04-20 PROCEDURE — 99213 OFFICE O/P EST LOW 20 MIN: CPT

## 2023-04-27 ENCOUNTER — APPOINTMENT (OUTPATIENT)
Dept: CARDIOLOGY | Facility: CLINIC | Age: 58
End: 2023-04-27
Payer: MEDICAID

## 2023-04-27 PROCEDURE — 99204 OFFICE O/P NEW MOD 45 MIN: CPT | Mod: 25

## 2023-04-27 PROCEDURE — 93015 CV STRESS TEST SUPVJ I&R: CPT

## 2023-05-05 ENCOUNTER — APPOINTMENT (OUTPATIENT)
Dept: RHEUMATOLOGY | Facility: CLINIC | Age: 58
End: 2023-05-05
Payer: MEDICAID

## 2023-05-05 VITALS — HEART RATE: 111 BPM

## 2023-05-05 VITALS
OXYGEN SATURATION: 98 % | TEMPERATURE: 97.8 F | HEIGHT: 65 IN | BODY MASS INDEX: 28.32 KG/M2 | WEIGHT: 170 LBS | SYSTOLIC BLOOD PRESSURE: 105 MMHG | DIASTOLIC BLOOD PRESSURE: 74 MMHG

## 2023-05-05 PROCEDURE — 99214 OFFICE O/P EST MOD 30 MIN: CPT

## 2023-05-05 RX ORDER — MELOXICAM 15 MG/1
15 TABLET ORAL DAILY
Qty: 30 | Refills: 2 | Status: COMPLETED | COMMUNITY
Start: 2021-11-11 | End: 2023-05-05

## 2023-05-05 NOTE — PHYSICAL EXAM
[General Appearance - Alert] : alert [General Appearance - In No Acute Distress] : in no acute distress [Sclera] : the sclera and conjunctiva were normal [Outer Ear] : the ears and nose were normal in appearance [Oropharynx] : the oropharynx was normal [Neck Appearance] : the appearance of the neck was normal [Neck Cervical Mass (___cm)] : no neck mass was observed [Jugular Venous Distention Increased] : there was no jugular-venous distention [Thyroid Diffuse Enlargement] : the thyroid was not enlarged [Thyroid Nodule] : there were no palpable thyroid nodules [Auscultation Breath Sounds / Voice Sounds] : lungs were clear to auscultation bilaterally [Heart Rate And Rhythm] : heart rate was normal and rhythm regular [Heart Sounds] : normal S1 and S2 [Heart Sounds Gallop] : no gallops [Murmurs] : no murmurs [Heart Sounds Pericardial Friction Rub] : no pericardial rub [Edema] : there was no peripheral edema [Bowel Sounds] : normal bowel sounds [Abdomen Soft] : soft [Abdomen Tenderness] : non-tender [Abdomen Mass (___ Cm)] : no abdominal mass palpated [Cervical Lymph Nodes Enlarged Posterior Bilaterally] : posterior cervical [Cervical Lymph Nodes Enlarged Anterior Bilaterally] : anterior cervical [Supraclavicular Lymph Nodes Enlarged Bilaterally] : supraclavicular [No Spinal Tenderness] : no spinal tenderness [FreeTextEntry1] : No synovitis, right shoulder now w/ full ROM, though w/ mild pain upon abduction and internal rotation; left shoulder w/ paoin upon motion in all planes; full ROM in rest of joints;  neck w/ normal ROM as well\par  [Skin Color & Pigmentation] : normal skin color and pigmentation [Skin Turgor] : normal skin turgor [] : no rash [No Focal Deficits] : no focal deficits [Oriented To Time, Place, And Person] : oriented to person, place, and time [Impaired Insight] : insight and judgment were intact [Affect] : the affect was normal

## 2023-05-05 NOTE — ASSESSMENT
[FreeTextEntry1] : 57 year old female with:\par 1)  Polyarticular joint pains:  While pt was found to have a weakly positive CCP and elevated ESR (though normal CRP), her presentation is most c/w non-inflammatory arthritis / OA, yovany given lack of improvement on prednisone.\par   - Reiterated importance of exercise\par   - Cont naproxen 500mg BID prn and/or Tylenol prn\par   - warm compresses\par   - OTC topical analgesics\par 2)  Neck pain:  x-rays revealed OA/DDD:  improved.\par   - Reiterated importance of neck exercises\par   - analgesia prn as above\par 3) B/L shoulder pain:  most c/w RTC tendinopathy.  Right shoulder pain much improved s/p recent corticosteroid injection.  Now w/ left shoulder pain, due to tendinosis.\par   - Cont shoulder exercises\par   - analgesia as above\par 4)  Low back pain / hip pain:  no red flag symptoms.  Currently much improved.\par   - Back exercises\par   - analgesia as above\par 5)  (+)NICOLE:  no obvious si/sx of underlying CTD and all sub-serologies negative\par   - Cont to monitor.\par 6)  Diffuse pain, persistent fatigue:  most c/w fibromyalgia though this is a diagnosis of exclusion.  Currently much improved.\par   - Reiterated importance exercise, yovany stretching, light aerobics, yoga, aquatic\par   - Sleep hygiene.

## 2023-05-05 NOTE — HISTORY OF PRESENT ILLNESS
[FreeTextEntry1] : Diffuse pain has improved.  She currently w/ pain in knees, ankles, and lower back upon getting up, then improves after walking around for about 10 minutes.  No new complaints.\par  [Anorexia] : no anorexia [Weight Loss] : no weight loss [Malaise] : no malaise [Fever] : no fever [Chills] : no chills [Fatigue] : fatigue [Malar Facial Rash] : no malar facial rash [Skin Lesions] : no lesions [Skin Nodules] : no skin nodules [Oral Ulcers] : no oral ulcers [Cough] : no cough [Dry Mouth] : no dry mouth [Dysphonia] : no dysphonia [Dysphagia] : no dysphagia [Shortness of Breath] : no shortness of breath [Chest Pain] : no chest pain [Arthralgias] : arthralgias [Joint Swelling] : joint swelling [Joint Warmth] : no joint warmth [Joint Deformity] : no joint deformity [Decreased ROM] : no decreased range of motion [Morning Stiffness] : morning stiffness [Falls] : no falls [Dyspnea] : no dyspnea [Myalgias] : no myalgias [Muscle Weakness] : no muscle weakness [Muscle Spasms] : no muscle spasms [Muscle Cramping] : no muscle cramping [Visual Changes] : no visual changes [Eye Pain] : no eye pain [Eye Redness] : no eye redness [Dry Eyes] : no dry eyes

## 2023-05-15 PROBLEM — R07.89 CHEST PAIN, ATYPICAL: Status: ACTIVE | Noted: 2022-02-09

## 2023-05-15 PROBLEM — R06.09 DYSPNEA ON EXERTION: Status: ACTIVE | Noted: 2023-04-27

## 2023-05-15 NOTE — DISCUSSION/SUMMARY
[FreeTextEntry1] : CP- matypical but with FH of premature CAd.  Plpan exercise stress and ECHO\par \par Followup in 3 weeks\par '\par Time spent reviewing history, exam, MD notes, pt discussion and note writing [EKG obtained to assist in diagnosis and management of assessed problem(s)] : EKG obtained to assist in diagnosis and management of assessed problem(s)

## 2023-05-15 NOTE — HISTORY OF PRESENT ILLNESS
[FreeTextEntry1] : 57 year old female with out past cardaic history or risks other than positive FH of premature CAD who presents with several months of chest pain with and without exertion but no SOB, H/A or radiation of pain

## 2023-05-19 ENCOUNTER — APPOINTMENT (OUTPATIENT)
Dept: FAMILY MEDICINE | Facility: CLINIC | Age: 58
End: 2023-05-19
Payer: MEDICAID

## 2023-05-19 VITALS
OXYGEN SATURATION: 98 % | HEIGHT: 65 IN | BODY MASS INDEX: 27.99 KG/M2 | HEART RATE: 95 BPM | DIASTOLIC BLOOD PRESSURE: 72 MMHG | SYSTOLIC BLOOD PRESSURE: 115 MMHG | WEIGHT: 168 LBS

## 2023-05-19 PROCEDURE — 99213 OFFICE O/P EST LOW 20 MIN: CPT

## 2023-05-19 NOTE — PHYSICAL EXAM
[No Acute Distress] : no acute distress [Normal Sclera/Conjunctiva] : normal sclera/conjunctiva [EOMI] : extraocular movements intact [Normal] : normal rate, regular rhythm, normal S1 and S2 and no murmur heard [Coordination Grossly Intact] : coordination grossly intact [Normal Affect] : the affect was normal [Alert and Oriented x3] : oriented to person, place, and time [Normal Insight/Judgement] : insight and judgment were intact

## 2023-05-19 NOTE — HISTORY OF PRESENT ILLNESS
[FreeTextEntry1] : here for bp, HR, wgt chk\par states losing wgt, but not enough, doesn't feel it curbs her appetite enough

## 2023-06-05 ENCOUNTER — RX RENEWAL (OUTPATIENT)
Age: 58
End: 2023-06-05

## 2023-06-13 ENCOUNTER — APPOINTMENT (OUTPATIENT)
Dept: FAMILY MEDICINE | Facility: CLINIC | Age: 58
End: 2023-06-13
Payer: MEDICAID

## 2023-06-13 VITALS
OXYGEN SATURATION: 98 % | SYSTOLIC BLOOD PRESSURE: 114 MMHG | WEIGHT: 168 LBS | TEMPERATURE: 97.8 F | BODY MASS INDEX: 27.99 KG/M2 | DIASTOLIC BLOOD PRESSURE: 78 MMHG | HEART RATE: 103 BPM | HEIGHT: 65 IN

## 2023-06-13 VITALS — HEART RATE: 90 BPM

## 2023-06-13 DIAGNOSIS — Z76.0 ENCOUNTER FOR ISSUE OF REPEAT PRESCRIPTION: ICD-10-CM

## 2023-06-13 DIAGNOSIS — F41.9 ANXIETY DISORDER, UNSPECIFIED: ICD-10-CM

## 2023-06-13 PROCEDURE — 99213 OFFICE O/P EST LOW 20 MIN: CPT

## 2023-06-13 NOTE — PHYSICAL EXAM
[No Acute Distress] : no acute distress [Normal Sclera/Conjunctiva] : normal sclera/conjunctiva [Normal] : normal rate, regular rhythm, normal S1 and S2 and no murmur heard [Coordination Grossly Intact] : coordination grossly intact [Normal Affect] : the affect was normal [Alert and Oriented x3] : oriented to person, place, and time [Normal Insight/Judgement] : insight and judgment were intact [Normal Gait] : normal gait [Normal Outer Ear/Nose] : the outer ears and nose were normal in appearance [No JVD] : no jugular venous distention

## 2023-06-13 NOTE — HISTORY OF PRESENT ILLNESS
[FreeTextEntry1] : here for follow up and med refills; wants increase in phentermine\par following up w/ cardio for abn stress test?, per pt, echo not covered?\par c/o stress; has  granddgtr, son has high volume of parental responsibilities

## 2023-06-13 NOTE — PLAN
[FreeTextEntry1] : refilled rx\par no increase in dosage advised at this time\par rtc in 1 mth\par healthy eating and exercise\par allowed to vent, emotional support provided\par cont cardio f/u

## 2023-06-30 ENCOUNTER — RX RENEWAL (OUTPATIENT)
Age: 58
End: 2023-06-30

## 2023-07-05 ENCOUNTER — APPOINTMENT (OUTPATIENT)
Dept: FAMILY MEDICINE | Facility: CLINIC | Age: 58
End: 2023-07-05
Payer: MEDICAID

## 2023-07-05 VITALS
HEART RATE: 70 BPM | SYSTOLIC BLOOD PRESSURE: 115 MMHG | DIASTOLIC BLOOD PRESSURE: 70 MMHG | HEIGHT: 65 IN | OXYGEN SATURATION: 98 % | TEMPERATURE: 98.5 F | BODY MASS INDEX: 27.16 KG/M2 | WEIGHT: 163 LBS

## 2023-07-05 DIAGNOSIS — E66.3 OVERWEIGHT: ICD-10-CM

## 2023-07-05 DIAGNOSIS — Z71.3 DIETARY COUNSELING AND SURVEILLANCE: ICD-10-CM

## 2023-07-05 DIAGNOSIS — R94.31 ABNORMAL ELECTROCARDIOGRAM [ECG] [EKG]: ICD-10-CM

## 2023-07-05 DIAGNOSIS — L72.3 SEBACEOUS CYST: ICD-10-CM

## 2023-07-05 PROCEDURE — 99213 OFFICE O/P EST LOW 20 MIN: CPT

## 2023-07-05 NOTE — HISTORY OF PRESENT ILLNESS
[FreeTextEntry1] : here for f/u; wants increase in phentermine; undergoing cardiac w/u\par c/o painful lump to left upper back

## 2023-07-05 NOTE — PHYSICAL EXAM
[No Acute Distress] : no acute distress [Normal Sclera/Conjunctiva] : normal sclera/conjunctiva [EOMI] : extraocular movements intact [Normal Outer Ear/Nose] : the outer ears and nose were normal in appearance [No JVD] : no jugular venous distention [Normal] : normal rate, regular rhythm, normal S1 and S2 and no murmur heard [Coordination Grossly Intact] : coordination grossly intact [Normal Affect] : the affect was normal [Alert and Oriented x3] : oriented to person, place, and time [Normal Insight/Judgement] : insight and judgment were intact [de-identified] : mild tenderness to left upper back sebaceous cyst, no erythema, no d/c

## 2023-07-05 NOTE — PLAN
[FreeTextEntry1] : contacted cardio for advice on increasing phentermine\par cont cardio f/u\par cont diet and exercise

## 2023-07-06 ENCOUNTER — NON-APPOINTMENT (OUTPATIENT)
Age: 58
End: 2023-07-06

## 2023-07-07 RX ORDER — PHENTERMINE HYDROCHLORIDE 15 MG/1
15 CAPSULE ORAL
Qty: 30 | Refills: 0 | Status: ACTIVE | COMMUNITY
Start: 2021-06-02 | End: 1900-01-01

## 2023-07-18 ENCOUNTER — APPOINTMENT (OUTPATIENT)
Dept: INTERNAL MEDICINE | Facility: CLINIC | Age: 58
End: 2023-07-18
Payer: MEDICAID

## 2023-07-18 PROCEDURE — 93306 TTE W/DOPPLER COMPLETE: CPT

## 2023-08-29 ENCOUNTER — NON-APPOINTMENT (OUTPATIENT)
Age: 58
End: 2023-08-29

## 2023-09-01 ENCOUNTER — APPOINTMENT (OUTPATIENT)
Dept: CT IMAGING | Facility: CLINIC | Age: 58
End: 2023-09-01
Payer: MEDICAID

## 2023-09-01 PROCEDURE — 75574 CT ANGIO HRT W/3D IMAGE: CPT

## 2023-09-13 ENCOUNTER — APPOINTMENT (OUTPATIENT)
Dept: RHEUMATOLOGY | Facility: CLINIC | Age: 58
End: 2023-09-13
Payer: MEDICAID

## 2023-09-13 VITALS
WEIGHT: 163 LBS | DIASTOLIC BLOOD PRESSURE: 86 MMHG | HEIGHT: 65 IN | BODY MASS INDEX: 27.16 KG/M2 | HEART RATE: 100 BPM | OXYGEN SATURATION: 98 % | SYSTOLIC BLOOD PRESSURE: 128 MMHG

## 2023-09-13 DIAGNOSIS — R76.8 OTHER SPECIFIED ABNORMAL IMMUNOLOGICAL FINDINGS IN SERUM: ICD-10-CM

## 2023-09-13 DIAGNOSIS — R52 PAIN, UNSPECIFIED: ICD-10-CM

## 2023-09-13 DIAGNOSIS — M75.41 IMPINGEMENT SYNDROME OF RIGHT SHOULDER: ICD-10-CM

## 2023-09-13 DIAGNOSIS — M54.50 LOW BACK PAIN, UNSPECIFIED: ICD-10-CM

## 2023-09-13 DIAGNOSIS — M79.602 PAIN IN RIGHT ARM: ICD-10-CM

## 2023-09-13 DIAGNOSIS — M25.551 PAIN IN RIGHT HIP: ICD-10-CM

## 2023-09-13 DIAGNOSIS — R53.83 OTHER FATIGUE: ICD-10-CM

## 2023-09-13 DIAGNOSIS — R68.2 DRY MOUTH, UNSPECIFIED: ICD-10-CM

## 2023-09-13 DIAGNOSIS — M25.552 PAIN IN RIGHT HIP: ICD-10-CM

## 2023-09-13 DIAGNOSIS — L65.9 NONSCARRING HAIR LOSS, UNSPECIFIED: ICD-10-CM

## 2023-09-13 DIAGNOSIS — M75.42 IMPINGEMENT SYNDROME OF RIGHT SHOULDER: ICD-10-CM

## 2023-09-13 DIAGNOSIS — M54.2 CERVICALGIA: ICD-10-CM

## 2023-09-13 DIAGNOSIS — M25.512 PAIN IN LEFT SHOULDER: ICD-10-CM

## 2023-09-13 DIAGNOSIS — M79.601 PAIN IN RIGHT ARM: ICD-10-CM

## 2023-09-13 PROCEDURE — 99214 OFFICE O/P EST MOD 30 MIN: CPT

## 2023-09-28 ENCOUNTER — RX RENEWAL (OUTPATIENT)
Age: 58
End: 2023-09-28

## 2023-10-02 ENCOUNTER — RX RENEWAL (OUTPATIENT)
Age: 58
End: 2023-10-02

## 2023-11-27 ENCOUNTER — RX RENEWAL (OUTPATIENT)
Age: 58
End: 2023-11-27

## 2023-11-27 RX ORDER — NAPROXEN 500 MG/1
500 TABLET ORAL
Qty: 60 | Refills: 1 | Status: ACTIVE | COMMUNITY
Start: 2023-05-05 | End: 1900-01-01

## 2023-12-12 ENCOUNTER — NON-APPOINTMENT (OUTPATIENT)
Age: 58
End: 2023-12-12

## 2023-12-20 ENCOUNTER — RX RENEWAL (OUTPATIENT)
Age: 58
End: 2023-12-20

## 2024-01-15 ENCOUNTER — RX RENEWAL (OUTPATIENT)
Age: 59
End: 2024-01-15

## 2024-01-15 RX ORDER — BUSPIRONE HYDROCHLORIDE 7.5 MG/1
7.5 TABLET ORAL
Qty: 60 | Refills: 2 | Status: ACTIVE | COMMUNITY
Start: 2019-01-14 | End: 1900-01-01

## 2024-01-17 ENCOUNTER — APPOINTMENT (OUTPATIENT)
Dept: RHEUMATOLOGY | Facility: CLINIC | Age: 59
End: 2024-01-17

## 2024-03-20 ENCOUNTER — RX RENEWAL (OUTPATIENT)
Age: 59
End: 2024-03-20

## 2024-06-24 ENCOUNTER — RX RENEWAL (OUTPATIENT)
Age: 59
End: 2024-06-24

## 2024-06-24 RX ORDER — OMEPRAZOLE 40 MG/1
40 CAPSULE, DELAYED RELEASE ORAL
Qty: 90 | Refills: 0 | Status: ACTIVE | COMMUNITY
Start: 2019-04-09 | End: 1900-01-01

## 2024-07-29 ENCOUNTER — RX RENEWAL (OUTPATIENT)
Age: 59
End: 2024-07-29

## 2024-08-21 ENCOUNTER — LABORATORY RESULT (OUTPATIENT)
Age: 59
End: 2024-08-21

## 2024-08-21 ENCOUNTER — NON-APPOINTMENT (OUTPATIENT)
Age: 59
End: 2024-08-21

## 2024-08-21 ENCOUNTER — APPOINTMENT (OUTPATIENT)
Dept: FAMILY MEDICINE | Facility: CLINIC | Age: 59
End: 2024-08-21
Payer: MEDICAID

## 2024-08-21 VITALS
WEIGHT: 176 LBS | TEMPERATURE: 98.4 F | HEART RATE: 93 BPM | OXYGEN SATURATION: 96 % | SYSTOLIC BLOOD PRESSURE: 111 MMHG | BODY MASS INDEX: 29.32 KG/M2 | DIASTOLIC BLOOD PRESSURE: 77 MMHG | HEIGHT: 65 IN | RESPIRATION RATE: 16 BRPM

## 2024-08-21 VITALS — HEART RATE: 76 BPM

## 2024-08-21 DIAGNOSIS — J30.2 OTHER SEASONAL ALLERGIC RHINITIS: ICD-10-CM

## 2024-08-21 DIAGNOSIS — E55.9 VITAMIN D DEFICIENCY, UNSPECIFIED: ICD-10-CM

## 2024-08-21 DIAGNOSIS — Z82.2 FAMILY HISTORY OF DEAFNESS AND HEARING LOSS: ICD-10-CM

## 2024-08-21 DIAGNOSIS — K29.70 GASTRITIS, UNSPECIFIED, W/OUT BLEEDING: ICD-10-CM

## 2024-08-21 DIAGNOSIS — R92.30 DENSE BREASTS, UNSPECIFIED: ICD-10-CM

## 2024-08-21 DIAGNOSIS — Z00.00 ENCOUNTER FOR GENERAL ADULT MEDICAL EXAMINATION W/OUT ABNORMAL FINDINGS: ICD-10-CM

## 2024-08-21 DIAGNOSIS — M25.512 PAIN IN LEFT SHOULDER: ICD-10-CM

## 2024-08-21 DIAGNOSIS — E61.1 IRON DEFICIENCY: ICD-10-CM

## 2024-08-21 DIAGNOSIS — Z86.69 PERSONAL HISTORY OF OTHER DISEASES OF THE NERVOUS SYSTEM AND SENSE ORGANS: ICD-10-CM

## 2024-08-21 DIAGNOSIS — F41.9 ANXIETY DISORDER, UNSPECIFIED: ICD-10-CM

## 2024-08-21 DIAGNOSIS — E78.1 PURE HYPERGLYCERIDEMIA: ICD-10-CM

## 2024-08-21 DIAGNOSIS — R11.15 CYCLICAL VOMITING SYNDROME UNRELATED TO MIGRAINE: ICD-10-CM

## 2024-08-21 DIAGNOSIS — R09.89 OTHER SPECIFIED SYMPTOMS AND SIGNS INVOLVING THE CIRCULATORY AND RESPIRATORY SYSTEMS: ICD-10-CM

## 2024-08-21 DIAGNOSIS — Z71.3 DIETARY COUNSELING AND SURVEILLANCE: ICD-10-CM

## 2024-08-21 DIAGNOSIS — Z76.0 ENCOUNTER FOR ISSUE OF REPEAT PRESCRIPTION: ICD-10-CM

## 2024-08-21 DIAGNOSIS — Z88.9 ALLERGY STATUS TO UNSPECIFIED DRUGS, MEDICAMENTS AND BIOLOGICAL SUBSTANCES: ICD-10-CM

## 2024-08-21 DIAGNOSIS — F10.20 ALCOHOL DEPENDENCE, UNCOMPLICATED: ICD-10-CM

## 2024-08-21 PROCEDURE — 99396 PREV VISIT EST AGE 40-64: CPT | Mod: 25

## 2024-08-21 PROCEDURE — 93000 ELECTROCARDIOGRAM COMPLETE: CPT

## 2024-08-21 RX ORDER — ETODOLAC 400 MG/1
400 TABLET, FILM COATED ORAL TWICE DAILY
Qty: 50 | Refills: 1 | Status: ACTIVE | COMMUNITY
Start: 2024-08-21 | End: 1900-01-01

## 2024-08-21 NOTE — HISTORY OF PRESENT ILLNESS
[FreeTextEntry1] : 58 year old female who presents today for a complete physical exam. feeling good, but unhappy about weight gain, would like higher dose of phentermine follows w/ group and individual therapy once a wk respectively c/o panic attacks every 2 mths or so

## 2024-08-21 NOTE — PHYSICAL EXAM
[No Acute Distress] : no acute distress [Well Nourished] : well nourished [Well Developed] : well developed [Well-Appearing] : well-appearing [Normal Sclera/Conjunctiva] : normal sclera/conjunctiva [PERRL] : pupils equal round and reactive to light [EOMI] : extraocular movements intact [Normal Outer Ear/Nose] : the outer ears and nose were normal in appearance [Normal Oropharynx] : the oropharynx was normal [Normal TMs] : both tympanic membranes were normal [No JVD] : no jugular venous distention [No Lymphadenopathy] : no lymphadenopathy [Supple] : supple [Thyroid Normal, No Nodules] : the thyroid was normal and there were no nodules present [No Respiratory Distress] : no respiratory distress  [No Accessory Muscle Use] : no accessory muscle use [Clear to Auscultation] : lungs were clear to auscultation bilaterally [Normal Rate] : normal rate  [Regular Rhythm] : with a regular rhythm [Normal S1, S2] : normal S1 and S2 [No Murmur] : no murmur heard [No Abdominal Bruit] : a ~M bruit was not heard ~T in the abdomen [No Varicosities] : no varicosities [No Edema] : there was no peripheral edema [No Palpable Aorta] : no palpable aorta [No Extremity Clubbing/Cyanosis] : no extremity clubbing/cyanosis [Normal Appearance] : normal in appearance [No Nipple Discharge] : no nipple discharge [No Axillary Lymphadenopathy] : no axillary lymphadenopathy [Soft] : abdomen soft [Non Tender] : non-tender [Non-distended] : non-distended [No Masses] : no abdominal mass palpated [No HSM] : no HSM [Normal Bowel Sounds] : normal bowel sounds [Normal Supraclavicular Nodes] : no supraclavicular lymphadenopathy [Normal Axillary Nodes] : no axillary lymphadenopathy [Normal Posterior Cervical Nodes] : no posterior cervical lymphadenopathy [Normal Anterior Cervical Nodes] : no anterior cervical lymphadenopathy [No CVA Tenderness] : no CVA  tenderness [No Spinal Tenderness] : no spinal tenderness [No Joint Swelling] : no joint swelling [Grossly Normal Strength/Tone] : grossly normal strength/tone [No Rash] : no rash [Coordination Grossly Intact] : coordination grossly intact [No Focal Deficits] : no focal deficits [Normal Gait] : normal gait [Normal Affect] : the affect was normal [Alert and Oriented x3] : oriented to person, place, and time [Normal Insight/Judgement] : insight and judgment were intact

## 2024-08-21 NOTE — HEALTH RISK ASSESSMENT
[Good] : ~his/her~  mood as  good [No] : In the past 12 months have you used drugs other than those required for medical reasons? No [Former] : Former [> 15 Years] : > 15 Years [0] : 2) Feeling down, depressed, or hopeless: Not at all (0) [NO] : No [Alone] : lives alone [Single] : single [Feels Safe at Home] : Feels safe at home [Fully functional (bathing, dressing, toileting, transferring, walking, feeding)] : Fully functional (bathing, dressing, toileting, transferring, walking, feeding) [Fully functional (using the telephone, shopping, preparing meals, housekeeping, doing laundry, using] : Fully functional and needs no help or supervision to perform IADLs (using the telephone, shopping, preparing meals, housekeeping, doing laundry, using transportation, managing medications and managing finances) [HIV test declined] : HIV test declined [Hepatitis C test declined] : Hepatitis C test declined [de-identified] : former etoh use [de-identified] : 10k steps [Change in mental status noted] : No change in mental status noted [Language] : denies difficulty with language [Handling Complex Tasks] : denies difficulty handling complex tasks [Sexually Active] : not sexually active [Reports changes in hearing] : Reports no changes in hearing [Reports changes in vision] : Reports no changes in vision [Reports changes in dental health] : Reports no changes in dental health

## 2024-08-22 ENCOUNTER — TRANSCRIPTION ENCOUNTER (OUTPATIENT)
Age: 59
End: 2024-08-22

## 2024-08-22 PROBLEM — R92.30 DENSE BREAST TISSUE ON MAMMOGRAM: Status: ACTIVE | Noted: 2019-11-21

## 2024-08-22 LAB
25(OH)D3 SERPL-MCNC: 36.2 NG/ML
ALBUMIN SERPL ELPH-MCNC: 4.3 G/DL
ALP BLD-CCNC: 72 U/L
ALT SERPL-CCNC: 11 U/L
ANION GAP SERPL CALC-SCNC: 14 MMOL/L
APPEARANCE: CLEAR
AST SERPL-CCNC: 18 U/L
BASOPHILS # BLD AUTO: 0.06 K/UL
BASOPHILS NFR BLD AUTO: 1 %
BILIRUB SERPL-MCNC: 0.3 MG/DL
BILIRUBIN URINE: NEGATIVE
BLOOD URINE: NEGATIVE
BUN SERPL-MCNC: 13 MG/DL
CALCIUM SERPL-MCNC: 9.2 MG/DL
CHLORIDE SERPL-SCNC: 104 MMOL/L
CHOLEST SERPL-MCNC: 196 MG/DL
CO2 SERPL-SCNC: 22 MMOL/L
COLOR: YELLOW
CREAT SERPL-MCNC: 0.65 MG/DL
EGFR: 103 ML/MIN/1.73M2
EOSINOPHIL # BLD AUTO: 0.08 K/UL
EOSINOPHIL NFR BLD AUTO: 1.4 %
ESTIMATED AVERAGE GLUCOSE: 108 MG/DL
FERRITIN SERPL-MCNC: 31 NG/ML
FOLATE SERPL-MCNC: 15.5 NG/ML
GLUCOSE QUALITATIVE U: NEGATIVE MG/DL
GLUCOSE SERPL-MCNC: 88 MG/DL
HBA1C MFR BLD HPLC: 5.4 %
HCT VFR BLD CALC: 43.5 %
HDLC SERPL-MCNC: 77 MG/DL
HGB BLD-MCNC: 13.7 G/DL
IMM GRANULOCYTES NFR BLD AUTO: 0.2 %
IRON SATN MFR SERPL: 19 %
IRON SERPL-MCNC: 63 UG/DL
KETONES URINE: NEGATIVE MG/DL
LDLC SERPL CALC-MCNC: 99 MG/DL
LEUKOCYTE ESTERASE URINE: ABNORMAL
LYMPHOCYTES # BLD AUTO: 2.14 K/UL
LYMPHOCYTES NFR BLD AUTO: 36.3 %
MAGNESIUM SERPL-MCNC: 1.9 MG/DL
MAN DIFF?: NORMAL
MCHC RBC-ENTMCNC: 30.2 PG
MCHC RBC-ENTMCNC: 31.5 GM/DL
MCV RBC AUTO: 96 FL
MONOCYTES # BLD AUTO: 0.48 K/UL
MONOCYTES NFR BLD AUTO: 8.1 %
NEUTROPHILS # BLD AUTO: 3.13 K/UL
NEUTROPHILS NFR BLD AUTO: 53 %
NITRITE URINE: NEGATIVE
NONHDLC SERPL-MCNC: 120 MG/DL
PH URINE: 5.5
PLATELET # BLD AUTO: 214 K/UL
POTASSIUM SERPL-SCNC: 4.2 MMOL/L
PROT SERPL-MCNC: 7 G/DL
PROTEIN URINE: NEGATIVE MG/DL
RBC # BLD: 4.53 M/UL
RBC # FLD: 13.8 %
SODIUM SERPL-SCNC: 139 MMOL/L
SPECIFIC GRAVITY URINE: 1.02
TIBC SERPL-MCNC: 339 UG/DL
TRIGL SERPL-MCNC: 121 MG/DL
TSH SERPL-ACNC: 2.22 UIU/ML
UIBC SERPL-MCNC: 276 UG/DL
UROBILINOGEN URINE: 0.2 MG/DL
VIT B12 SERPL-MCNC: 567 PG/ML
WBC # FLD AUTO: 5.9 K/UL

## 2024-09-11 ENCOUNTER — APPOINTMENT (OUTPATIENT)
Dept: FAMILY MEDICINE | Facility: CLINIC | Age: 59
End: 2024-09-11
Payer: MEDICAID

## 2024-09-11 VITALS
WEIGHT: 174 LBS | TEMPERATURE: 97.6 F | BODY MASS INDEX: 28.99 KG/M2 | OXYGEN SATURATION: 97 % | DIASTOLIC BLOOD PRESSURE: 80 MMHG | SYSTOLIC BLOOD PRESSURE: 128 MMHG | HEIGHT: 65 IN | RESPIRATION RATE: 16 BRPM | HEART RATE: 88 BPM

## 2024-09-11 DIAGNOSIS — R23.8 OTHER SKIN CHANGES: ICD-10-CM

## 2024-09-11 PROCEDURE — 99204 OFFICE O/P NEW MOD 45 MIN: CPT

## 2024-09-11 NOTE — HISTORY OF PRESENT ILLNESS
[FreeTextEntry1] : Here for follow-up [de-identified] : Here for follow-up. Questions about labs results from last visit, EKG and family history-COPD in chart.  Concerned she had a UTI based on last results.  Taking Phentermine-trying to reach 97605 steps a day.  Not formally exercising.     Has been a few years since seen GYN-going to try and make GYN appointment.  No periods for 2 years.   Also with ongoing Left Shoulder Pain Had right shoulder injury few years ago.  Notes she has not been taking the Vitamin supplements prior to the last visit.  No etoh use currently.   Also notes skin on forearms has been more bumpy/dry the past few weeks.

## 2024-09-11 NOTE — PLAN
[FreeTextEntry1] : Recheck vitamin levels in 3 months-if not taking supplements currently-was maintaining off. Overweight-discussed healthy diet and exercise.  Goal of 150 minutes of physical activity,  Return in 1 month for weight recheck.  Tolerating phentermine. I-STOP checked. Precautions discussed, need for routine follow-up in office every 3 months.  Shoulder pain-Never picked up Etodolac-didn't work last time.  Took Naprosyn  -Ongoing left shoulder pain.   Recommending Ortho eval.    Previous EKG and Urine results reviewed-no UTI-patient with no symptoms currently.  Skin Irritation-Derm eval. Advised GYN eval.  Patient expressed understanding of plan.

## 2024-09-11 NOTE — REVIEW OF SYSTEMS
[Negative] : Gastrointestinal [Fever] : no fever [Chills] : no chills [Chest Pain] : no chest pain [Palpitations] : no palpitations [Shortness Of Breath] : no shortness of breath [Dysuria] : no dysuria [FreeTextEntry9] : left shoulder pain

## 2024-09-11 NOTE — HISTORY OF PRESENT ILLNESS
[FreeTextEntry1] : Here for follow-up [de-identified] : Here for follow-up. Questions about labs results from last visit, EKG and family history-COPD in chart.  Concerned she had a UTI based on last results.  Taking Phentermine-trying to reach 23101 steps a day.  Not formally exercising.     Has been a few years since seen GYN-going to try and make GYN appointment.  No periods for 2 years.   Also with ongoing Left Shoulder Pain Had right shoulder injury few years ago.  Notes she has not been taking the Vitamin supplements prior to the last visit.  No etoh use currently.   Also notes skin on forearms has been more bumpy/dry the past few weeks.

## 2024-09-11 NOTE — PHYSICAL EXAM
[No Acute Distress] : no acute distress [Normal Oropharynx] : the oropharynx was normal [No Lymphadenopathy] : no lymphadenopathy [No Edema] : there was no peripheral edema [No Extremity Clubbing/Cyanosis] : no extremity clubbing/cyanosis [No CVA Tenderness] : no CVA  tenderness [No Rash] : no rash [Normal] : affect was normal and insight and judgment were intact [de-identified] : Left shoulder ABDUCTION restriction [de-identified] : dry skin on forearms-no sign of infection

## 2024-09-11 NOTE — PHYSICAL EXAM
[No Acute Distress] : no acute distress [Normal Oropharynx] : the oropharynx was normal [No Lymphadenopathy] : no lymphadenopathy [No Edema] : there was no peripheral edema [No Extremity Clubbing/Cyanosis] : no extremity clubbing/cyanosis [No CVA Tenderness] : no CVA  tenderness [No Rash] : no rash [Normal] : affect was normal and insight and judgment were intact [de-identified] : Left shoulder ABDUCTION restriction [de-identified] : dry skin on forearms-no sign of infection

## 2024-09-18 ENCOUNTER — APPOINTMENT (OUTPATIENT)
Dept: FAMILY MEDICINE | Facility: CLINIC | Age: 59
End: 2024-09-18
Payer: MEDICAID

## 2024-09-18 VITALS
HEIGHT: 65 IN | SYSTOLIC BLOOD PRESSURE: 119 MMHG | DIASTOLIC BLOOD PRESSURE: 83 MMHG | HEART RATE: 107 BPM | RESPIRATION RATE: 16 BRPM | TEMPERATURE: 96.9 F | BODY MASS INDEX: 28.82 KG/M2 | WEIGHT: 173 LBS | OXYGEN SATURATION: 96 %

## 2024-09-18 VITALS — HEART RATE: 96 BPM

## 2024-09-18 DIAGNOSIS — M54.2 CERVICALGIA: ICD-10-CM

## 2024-09-18 DIAGNOSIS — F41.9 ANXIETY DISORDER, UNSPECIFIED: ICD-10-CM

## 2024-09-18 DIAGNOSIS — Z71.3 DIETARY COUNSELING AND SURVEILLANCE: ICD-10-CM

## 2024-09-18 DIAGNOSIS — E66.3 OVERWEIGHT: ICD-10-CM

## 2024-09-18 PROCEDURE — 99214 OFFICE O/P EST MOD 30 MIN: CPT

## 2024-09-18 RX ORDER — CYCLOBENZAPRINE HYDROCHLORIDE 10 MG/1
10 TABLET, FILM COATED ORAL
Qty: 30 | Refills: 0 | Status: ACTIVE | COMMUNITY
Start: 2024-09-18 | End: 1900-01-01

## 2024-09-18 NOTE — PLAN
[FreeTextEntry1] : allowed to vent, emotional support provided refilled rx cyclobenzaprine, naproxen f/u w/ ortho massage, possibly needs PT rtc in 1 mth

## 2024-09-18 NOTE — PHYSICAL EXAM
[No Acute Distress] : no acute distress [Normal Sclera/Conjunctiva] : normal sclera/conjunctiva [EOMI] : extraocular movements intact [Normal Outer Ear/Nose] : the outer ears and nose were normal in appearance [Normal] : normal rate, regular rhythm, normal S1 and S2 and no murmur heard [Coordination Grossly Intact] : coordination grossly intact [Normal Affect] : the affect was normal [Alert and Oriented x3] : oriented to person, place, and time [Normal Insight/Judgement] : insight and judgment were intact [No Focal Deficits] : no focal deficits [Normal Gait] : normal gait [de-identified] : difficulty lifting left arm [de-identified] : anxious
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
## 2024-09-25 ENCOUNTER — APPOINTMENT (OUTPATIENT)
Dept: ORTHOPEDIC SURGERY | Facility: CLINIC | Age: 59
End: 2024-09-25
Payer: MEDICAID

## 2024-09-25 VITALS — WEIGHT: 170 LBS | HEIGHT: 65 IN | BODY MASS INDEX: 28.32 KG/M2

## 2024-09-25 DIAGNOSIS — M25.512 PAIN IN LEFT SHOULDER: ICD-10-CM

## 2024-09-25 PROCEDURE — 73030 X-RAY EXAM OF SHOULDER: CPT | Mod: LT

## 2024-09-25 PROCEDURE — 99203 OFFICE O/P NEW LOW 30 MIN: CPT | Mod: 25

## 2024-09-25 NOTE — PHYSICAL EXAM
[de-identified] : General Exam  Well developed, well nourished  No apparent distress  Oriented to person, place, and time  Mood: Normal  Affect: Normal  Balance and coordination: Normal  Gait: Normal  Left shoulder exam   Inspection: No swelling, ecchymosis or gross deformity. Skin: No masses, No lesions  Tenderness: No bicipital tenderness, no tenderness to the greater tuberosity/RTC insertion, no anterior shoulder/lesser tuberosity tenderness. No tenderness SC joint, clavicle, AC joint. ROM: 160/60/T6 there is pain at end ranges of motion Impingement tests: Positive [place] AC Joint: no pain with cross arm testing Biceps: Negative speed Strength: 5-/5 abduction, 5/5 external rotation, and internal rotation Neuro: AIN, PIN, Ulnar nerve motor intact Sensation: Intact to light touch in radial, median, ulnar, and axillary nerve distributions Vasc: 2+ radial pulse  [de-identified] : The following radiographs were ordered and read by me during this patients visit. I reviewed each radiograph in detail with the patient and discussed the findings as highlighted below.  3 views left shoulder obtained today the glenohumeral joint is well-maintained there is normal alignment there is no fracture

## 2024-09-25 NOTE — HISTORY OF PRESENT ILLNESS
[de-identified] : 58-year-old female longstanding plaint left shoulder pain.  She reports about history of a right shoulder fracture that healed well she has persistent pain in her left shoulder.  This been present for many years she has pain with overhead activity she has pain at nighttime while sleeping.  She does not like taking medications was not done much in terms of anti-inflammatories.  Steroid was reckon by her medical doctor which she did not use.  Her pain is worsening.  She reports weakness.  She denies any falls or trauma in regards to her left shoulder The patient's past medical history, past surgical history, medications, allergies, and social history were reviewed by me today with the patient and documented accordingly. In addition, the patient's family history, which is noncontributory to this visit, was also reviewed.

## 2024-10-16 ENCOUNTER — APPOINTMENT (OUTPATIENT)
Dept: FAMILY MEDICINE | Facility: CLINIC | Age: 59
End: 2024-10-16

## 2024-10-23 ENCOUNTER — APPOINTMENT (OUTPATIENT)
Dept: FAMILY MEDICINE | Facility: CLINIC | Age: 59
End: 2024-10-23
Payer: MEDICAID

## 2024-10-23 VITALS
TEMPERATURE: 97.8 F | SYSTOLIC BLOOD PRESSURE: 114 MMHG | HEART RATE: 122 BPM | DIASTOLIC BLOOD PRESSURE: 75 MMHG | RESPIRATION RATE: 18 BRPM | HEIGHT: 65 IN | OXYGEN SATURATION: 97 % | WEIGHT: 183 LBS | BODY MASS INDEX: 30.49 KG/M2

## 2024-10-23 DIAGNOSIS — M25.512 PAIN IN LEFT SHOULDER: ICD-10-CM

## 2024-10-23 DIAGNOSIS — R00.0 TACHYCARDIA, UNSPECIFIED: ICD-10-CM

## 2024-10-23 DIAGNOSIS — Z71.3 DIETARY COUNSELING AND SURVEILLANCE: ICD-10-CM

## 2024-10-23 DIAGNOSIS — E66.3 OVERWEIGHT: ICD-10-CM

## 2024-10-23 DIAGNOSIS — F41.9 ANXIETY DISORDER, UNSPECIFIED: ICD-10-CM

## 2024-10-23 DIAGNOSIS — Z71.89 OTHER SPECIFIED COUNSELING: ICD-10-CM

## 2024-10-23 PROCEDURE — 99214 OFFICE O/P EST MOD 30 MIN: CPT

## 2024-10-31 RX ORDER — DIAZEPAM 10 MG/1
10 TABLET ORAL
Qty: 1 | Refills: 0 | Status: ACTIVE | COMMUNITY
Start: 2024-10-31 | End: 1900-01-01

## 2024-11-14 ENCOUNTER — APPOINTMENT (OUTPATIENT)
Dept: FAMILY MEDICINE | Facility: CLINIC | Age: 59
End: 2024-11-14
Payer: MEDICAID

## 2024-11-14 VITALS
DIASTOLIC BLOOD PRESSURE: 84 MMHG | SYSTOLIC BLOOD PRESSURE: 120 MMHG | RESPIRATION RATE: 18 BRPM | BODY MASS INDEX: 31.32 KG/M2 | OXYGEN SATURATION: 97 % | WEIGHT: 188 LBS | TEMPERATURE: 97.8 F | HEART RATE: 107 BPM | HEIGHT: 65 IN

## 2024-11-14 VITALS — HEART RATE: 90 BPM

## 2024-11-14 DIAGNOSIS — Z71.89 OTHER SPECIFIED COUNSELING: ICD-10-CM

## 2024-11-14 DIAGNOSIS — E66.3 OVERWEIGHT: ICD-10-CM

## 2024-11-14 DIAGNOSIS — Z71.3 DIETARY COUNSELING AND SURVEILLANCE: ICD-10-CM

## 2024-11-14 DIAGNOSIS — F41.9 ANXIETY DISORDER, UNSPECIFIED: ICD-10-CM

## 2024-11-14 PROCEDURE — 99213 OFFICE O/P EST LOW 20 MIN: CPT

## 2024-11-27 NOTE — HISTORY OF PRESENT ILLNESS
[FreeTextEntry1] : here for vivitrol shot\par still going to AA mtgs; hasn't drink since\par son's wedding coming up, wants her wgt to come down
No lymphadedenopathy

## 2024-12-12 ENCOUNTER — NON-APPOINTMENT (OUTPATIENT)
Age: 59
End: 2024-12-12

## 2024-12-13 ENCOUNTER — APPOINTMENT (OUTPATIENT)
Dept: FAMILY MEDICINE | Facility: CLINIC | Age: 59
End: 2024-12-13
Payer: MEDICAID

## 2024-12-13 VITALS
OXYGEN SATURATION: 96 % | WEIGHT: 187 LBS | BODY MASS INDEX: 31.16 KG/M2 | HEART RATE: 84 BPM | RESPIRATION RATE: 18 BRPM | TEMPERATURE: 97.6 F | HEIGHT: 65 IN | SYSTOLIC BLOOD PRESSURE: 132 MMHG | DIASTOLIC BLOOD PRESSURE: 83 MMHG

## 2024-12-13 DIAGNOSIS — E66.3 OVERWEIGHT: ICD-10-CM

## 2024-12-13 DIAGNOSIS — F43.0 ACUTE STRESS REACTION: ICD-10-CM

## 2024-12-13 DIAGNOSIS — R52 PAIN, UNSPECIFIED: ICD-10-CM

## 2024-12-13 DIAGNOSIS — R23.8 OTHER SKIN CHANGES: ICD-10-CM

## 2024-12-13 DIAGNOSIS — Z87.898 PERSONAL HISTORY OF OTHER SPECIFIED CONDITIONS: ICD-10-CM

## 2024-12-13 DIAGNOSIS — F41.9 ANXIETY DISORDER, UNSPECIFIED: ICD-10-CM

## 2024-12-13 DIAGNOSIS — Z71.89 OTHER SPECIFIED COUNSELING: ICD-10-CM

## 2024-12-13 DIAGNOSIS — Z71.3 DIETARY COUNSELING AND SURVEILLANCE: ICD-10-CM

## 2024-12-13 DIAGNOSIS — Z76.0 ENCOUNTER FOR ISSUE OF REPEAT PRESCRIPTION: ICD-10-CM

## 2024-12-13 DIAGNOSIS — R03.0 ELEVATED BLOOD-PRESSURE READING, W/OUT DIAGNOSIS OF HYPERTENSION: ICD-10-CM

## 2024-12-13 DIAGNOSIS — R07.89 OTHER CHEST PAIN: ICD-10-CM

## 2024-12-13 PROCEDURE — 99214 OFFICE O/P EST MOD 30 MIN: CPT

## 2024-12-13 RX ORDER — HYDROCHLOROTHIAZIDE 12.5 MG/1
12.5 TABLET ORAL
Qty: 30 | Refills: 1 | Status: ACTIVE | COMMUNITY
Start: 2024-12-13 | End: 1900-01-01

## 2024-12-23 ENCOUNTER — RX RENEWAL (OUTPATIENT)
Age: 59
End: 2024-12-23

## 2025-01-03 ENCOUNTER — APPOINTMENT (OUTPATIENT)
Dept: ORTHOPEDIC SURGERY | Facility: CLINIC | Age: 60
End: 2025-01-03
Payer: MEDICAID

## 2025-01-03 DIAGNOSIS — M25.512 PAIN IN LEFT SHOULDER: ICD-10-CM

## 2025-01-03 PROCEDURE — 99213 OFFICE O/P EST LOW 20 MIN: CPT

## 2025-01-03 RX ORDER — MELOXICAM 15 MG/1
15 TABLET ORAL
Qty: 30 | Refills: 1 | Status: ACTIVE | COMMUNITY
Start: 2025-01-03 | End: 1900-01-01

## 2025-01-09 ENCOUNTER — APPOINTMENT (OUTPATIENT)
Dept: FAMILY MEDICINE | Facility: CLINIC | Age: 60
End: 2025-01-09
Payer: MEDICAID

## 2025-01-09 VITALS
RESPIRATION RATE: 18 BRPM | BODY MASS INDEX: 30.82 KG/M2 | TEMPERATURE: 97.8 F | HEART RATE: 88 BPM | OXYGEN SATURATION: 96 % | DIASTOLIC BLOOD PRESSURE: 80 MMHG | WEIGHT: 185 LBS | SYSTOLIC BLOOD PRESSURE: 138 MMHG | HEIGHT: 65 IN

## 2025-01-09 DIAGNOSIS — R03.0 ELEVATED BLOOD-PRESSURE READING, W/OUT DIAGNOSIS OF HYPERTENSION: ICD-10-CM

## 2025-01-09 DIAGNOSIS — Z71.89 OTHER SPECIFIED COUNSELING: ICD-10-CM

## 2025-01-09 DIAGNOSIS — E66.811 OBESITY, CLASS 1: ICD-10-CM

## 2025-01-09 DIAGNOSIS — E66.3 OVERWEIGHT: ICD-10-CM

## 2025-01-09 DIAGNOSIS — F41.9 ANXIETY DISORDER, UNSPECIFIED: ICD-10-CM

## 2025-01-09 PROCEDURE — 99214 OFFICE O/P EST MOD 30 MIN: CPT

## 2025-02-06 ENCOUNTER — APPOINTMENT (OUTPATIENT)
Dept: INTERNAL MEDICINE | Facility: CLINIC | Age: 60
End: 2025-02-06
Payer: MEDICAID

## 2025-02-06 PROCEDURE — 99204 OFFICE O/P NEW MOD 45 MIN: CPT | Mod: 95

## 2025-02-06 RX ORDER — TOPIRAMATE 50 MG/1
50 TABLET, FILM COATED ORAL
Qty: 90 | Refills: 0 | Status: ACTIVE | COMMUNITY
Start: 2025-02-06 | End: 1900-01-01

## 2025-02-13 ENCOUNTER — APPOINTMENT (OUTPATIENT)
Dept: FAMILY MEDICINE | Facility: CLINIC | Age: 60
End: 2025-02-13
Payer: MEDICAID

## 2025-02-13 VITALS
TEMPERATURE: 97.9 F | OXYGEN SATURATION: 98 % | WEIGHT: 181 LBS | HEIGHT: 65 IN | HEART RATE: 102 BPM | RESPIRATION RATE: 18 BRPM | BODY MASS INDEX: 30.16 KG/M2

## 2025-02-13 VITALS — DIASTOLIC BLOOD PRESSURE: 90 MMHG | SYSTOLIC BLOOD PRESSURE: 140 MMHG

## 2025-02-13 VITALS — SYSTOLIC BLOOD PRESSURE: 140 MMHG | DIASTOLIC BLOOD PRESSURE: 90 MMHG

## 2025-02-13 DIAGNOSIS — Z76.0 ENCOUNTER FOR ISSUE OF REPEAT PRESCRIPTION: ICD-10-CM

## 2025-02-13 DIAGNOSIS — E66.811 OBESITY, CLASS 1: ICD-10-CM

## 2025-02-13 DIAGNOSIS — R03.0 ELEVATED BLOOD-PRESSURE READING, W/OUT DIAGNOSIS OF HYPERTENSION: ICD-10-CM

## 2025-02-13 DIAGNOSIS — Z71.3 DIETARY COUNSELING AND SURVEILLANCE: ICD-10-CM

## 2025-02-13 DIAGNOSIS — F41.9 ANXIETY DISORDER, UNSPECIFIED: ICD-10-CM

## 2025-02-13 DIAGNOSIS — Z71.89 OTHER SPECIFIED COUNSELING: ICD-10-CM

## 2025-02-13 PROCEDURE — 99213 OFFICE O/P EST LOW 20 MIN: CPT

## 2025-02-14 ENCOUNTER — NON-APPOINTMENT (OUTPATIENT)
Age: 60
End: 2025-02-14

## 2025-02-14 ENCOUNTER — APPOINTMENT (OUTPATIENT)
Dept: BARIATRICS | Facility: CLINIC | Age: 60
End: 2025-02-14

## 2025-02-14 VITALS
WEIGHT: 182 LBS | HEART RATE: 103 BPM | OXYGEN SATURATION: 96 % | SYSTOLIC BLOOD PRESSURE: 142 MMHG | TEMPERATURE: 96.9 F | DIASTOLIC BLOOD PRESSURE: 85 MMHG | HEIGHT: 65 IN | BODY MASS INDEX: 30.32 KG/M2

## 2025-03-01 ENCOUNTER — APPOINTMENT (OUTPATIENT)
Dept: FAMILY MEDICINE | Facility: CLINIC | Age: 60
End: 2025-03-01
Payer: MEDICAID

## 2025-03-01 VITALS
RESPIRATION RATE: 18 BRPM | HEART RATE: 92 BPM | BODY MASS INDEX: 30.32 KG/M2 | HEIGHT: 65 IN | DIASTOLIC BLOOD PRESSURE: 90 MMHG | SYSTOLIC BLOOD PRESSURE: 130 MMHG | WEIGHT: 182 LBS | TEMPERATURE: 98.1 F | OXYGEN SATURATION: 97 %

## 2025-03-01 VITALS — DIASTOLIC BLOOD PRESSURE: 86 MMHG | SYSTOLIC BLOOD PRESSURE: 140 MMHG

## 2025-03-01 DIAGNOSIS — Z71.3 DIETARY COUNSELING AND SURVEILLANCE: ICD-10-CM

## 2025-03-01 DIAGNOSIS — R03.0 ELEVATED BLOOD-PRESSURE READING, W/OUT DIAGNOSIS OF HYPERTENSION: ICD-10-CM

## 2025-03-01 DIAGNOSIS — Z71.89 OTHER SPECIFIED COUNSELING: ICD-10-CM

## 2025-03-01 PROCEDURE — 99214 OFFICE O/P EST MOD 30 MIN: CPT

## 2025-03-05 ENCOUNTER — APPOINTMENT (OUTPATIENT)
Dept: INTERNAL MEDICINE | Facility: CLINIC | Age: 60
End: 2025-03-05
Payer: MEDICAID

## 2025-03-05 DIAGNOSIS — E66.811 OBESITY, CLASS 1: ICD-10-CM

## 2025-03-05 PROCEDURE — 99202 OFFICE O/P NEW SF 15 MIN: CPT | Mod: 93

## 2025-03-21 ENCOUNTER — RX RENEWAL (OUTPATIENT)
Age: 60
End: 2025-03-21

## 2025-03-28 ENCOUNTER — APPOINTMENT (OUTPATIENT)
Dept: BARIATRICS | Facility: CLINIC | Age: 60
End: 2025-03-28
Payer: MEDICAID

## 2025-03-28 VITALS
DIASTOLIC BLOOD PRESSURE: 77 MMHG | SYSTOLIC BLOOD PRESSURE: 114 MMHG | BODY MASS INDEX: 30.16 KG/M2 | HEIGHT: 65 IN | OXYGEN SATURATION: 100 % | WEIGHT: 181 LBS | HEART RATE: 77 BPM | TEMPERATURE: 97.9 F

## 2025-03-28 PROCEDURE — 97803 MED NUTRITION INDIV SUBSEQ: CPT

## 2025-03-31 ENCOUNTER — RX RENEWAL (OUTPATIENT)
Age: 60
End: 2025-03-31

## 2025-04-09 ENCOUNTER — APPOINTMENT (OUTPATIENT)
Dept: FAMILY MEDICINE | Facility: CLINIC | Age: 60
End: 2025-04-09
Payer: MEDICAID

## 2025-04-09 VITALS
BODY MASS INDEX: 30.16 KG/M2 | TEMPERATURE: 97.8 F | WEIGHT: 181 LBS | RESPIRATION RATE: 17 BRPM | OXYGEN SATURATION: 97 % | HEART RATE: 83 BPM | DIASTOLIC BLOOD PRESSURE: 85 MMHG | HEIGHT: 65 IN | SYSTOLIC BLOOD PRESSURE: 133 MMHG

## 2025-04-09 VITALS — DIASTOLIC BLOOD PRESSURE: 80 MMHG | SYSTOLIC BLOOD PRESSURE: 118 MMHG

## 2025-04-09 DIAGNOSIS — R03.0 ELEVATED BLOOD-PRESSURE READING, W/OUT DIAGNOSIS OF HYPERTENSION: ICD-10-CM

## 2025-04-09 DIAGNOSIS — F41.9 ANXIETY DISORDER, UNSPECIFIED: ICD-10-CM

## 2025-04-09 DIAGNOSIS — Z71.3 DIETARY COUNSELING AND SURVEILLANCE: ICD-10-CM

## 2025-04-09 DIAGNOSIS — E66.811 OBESITY, CLASS 1: ICD-10-CM

## 2025-04-09 DIAGNOSIS — Z71.89 OTHER SPECIFIED COUNSELING: ICD-10-CM

## 2025-04-09 PROCEDURE — 99213 OFFICE O/P EST LOW 20 MIN: CPT

## 2025-04-24 ENCOUNTER — RX RENEWAL (OUTPATIENT)
Age: 60
End: 2025-04-24

## 2025-04-30 NOTE — PHYSICAL EXAM
[General Appearance - Alert] : alert [General Appearance - In No Acute Distress] : in no acute distress [Sclera] : the sclera and conjunctiva were normal [PERRL With Normal Accommodation] : pupils were equal in size, round, and reactive to light [Extraocular Movements] : extraocular movements were intact [Outer Ear] : the ears and nose were normal in appearance [Oropharynx] : the oropharynx was normal [Neck Appearance] : the appearance of the neck was normal [Neck Cervical Mass (___cm)] : no neck mass was observed [Jugular Venous Distention Increased] : there was no jugular-venous distention [Thyroid Diffuse Enlargement] : the thyroid was not enlarged [Thyroid Nodule] : there were no palpable thyroid nodules [Auscultation Breath Sounds / Voice Sounds] : lungs were clear to auscultation bilaterally [Heart Rate And Rhythm] : heart rate was normal and rhythm regular [Heart Sounds] : normal S1 and S2 [Heart Sounds Gallop] : no gallops [Murmurs] : no murmurs [Heart Sounds Pericardial Friction Rub] : no pericardial rub [Full Pulse] : the pedal pulses are present [Edema] : there was no peripheral edema [Bowel Sounds] : normal bowel sounds Intact [Abdomen Soft] : soft [Abdomen Tenderness] : non-tender [Abdomen Mass (___ Cm)] : no abdominal mass palpated [External Female Genitalia] : normal external genitalia [Urethral Meatus] : normal urethra [Urinary Bladder Findings] : the bladder was normal on palpation [Cervical Lymph Nodes Enlarged Posterior Bilaterally] : posterior cervical [Cervical Lymph Nodes Enlarged Anterior Bilaterally] : anterior cervical [Supraclavicular Lymph Nodes Enlarged Bilaterally] : supraclavicular [Axillary Lymph Nodes Enlarged Bilaterally] : axillary [Femoral Lymph Nodes Enlarged Bilaterally] : femoral [Inguinal Lymph Nodes Enlarged Bilaterally] : inguinal [No CVA Tenderness] : no ~M costovertebral angle tenderness [No Spinal Tenderness] : no spinal tenderness [Abnormal Walk] : normal gait [Nail Clubbing] : no clubbing  or cyanosis of the fingernails [Musculoskeletal - Swelling] : no joint swelling seen [Motor Tone] : muscle strength and tone were normal [Skin Color & Pigmentation] : normal skin color and pigmentation [Skin Turgor] : normal skin turgor [] : no rash [Deep Tendon Reflexes (DTR)] : deep tendon reflexes were 2+ and symmetric [Sensation] : the sensory exam was normal to light touch and pinprick [No Focal Deficits] : no focal deficits [Oriented To Time, Place, And Person] : oriented to person, place, and time [Impaired Insight] : insight and judgment were intact [Affect] : the affect was normal

## 2025-05-06 ENCOUNTER — APPOINTMENT (OUTPATIENT)
Dept: FAMILY MEDICINE | Facility: CLINIC | Age: 60
End: 2025-05-06
Payer: MEDICAID

## 2025-05-06 VITALS
SYSTOLIC BLOOD PRESSURE: 110 MMHG | BODY MASS INDEX: 30.16 KG/M2 | DIASTOLIC BLOOD PRESSURE: 76 MMHG | WEIGHT: 181 LBS | OXYGEN SATURATION: 98 % | HEART RATE: 96 BPM | HEIGHT: 65 IN

## 2025-05-06 DIAGNOSIS — F41.9 ANXIETY DISORDER, UNSPECIFIED: ICD-10-CM

## 2025-05-06 DIAGNOSIS — E66.811 OBESITY, CLASS 1: ICD-10-CM

## 2025-05-06 DIAGNOSIS — G47.00 INSOMNIA, UNSPECIFIED: ICD-10-CM

## 2025-05-06 DIAGNOSIS — E61.1 IRON DEFICIENCY: ICD-10-CM

## 2025-05-06 DIAGNOSIS — R53.83 OTHER FATIGUE: ICD-10-CM

## 2025-05-06 PROCEDURE — 99214 OFFICE O/P EST MOD 30 MIN: CPT

## 2025-05-06 PROCEDURE — G2211 COMPLEX E/M VISIT ADD ON: CPT | Mod: NC

## 2025-05-09 ENCOUNTER — APPOINTMENT (OUTPATIENT)
Dept: BARIATRICS | Facility: CLINIC | Age: 60
End: 2025-05-09
Payer: MEDICAID

## 2025-05-09 VITALS
OXYGEN SATURATION: 97 % | HEIGHT: 65 IN | BODY MASS INDEX: 30.49 KG/M2 | HEART RATE: 85 BPM | SYSTOLIC BLOOD PRESSURE: 145 MMHG | WEIGHT: 183 LBS | TEMPERATURE: 97.7 F | DIASTOLIC BLOOD PRESSURE: 86 MMHG

## 2025-05-09 PROCEDURE — 97803 MED NUTRITION INDIV SUBSEQ: CPT

## 2025-05-11 LAB
ALBUMIN SERPL ELPH-MCNC: 4.5 G/DL
ALP BLD-CCNC: 103 U/L
ALT SERPL-CCNC: 14 U/L
ANION GAP SERPL CALC-SCNC: 16 MMOL/L
AST SERPL-CCNC: 17 U/L
BASOPHILS # BLD AUTO: 0.05 K/UL
BASOPHILS NFR BLD AUTO: 0.9 %
BILIRUB SERPL-MCNC: 0.2 MG/DL
BUN SERPL-MCNC: 12 MG/DL
CALCIUM SERPL-MCNC: 10.2 MG/DL
CHLORIDE SERPL-SCNC: 107 MMOL/L
CO2 SERPL-SCNC: 20 MMOL/L
CREAT SERPL-MCNC: 0.68 MG/DL
EGFRCR SERPLBLD CKD-EPI 2021: 101 ML/MIN/1.73M2
EOSINOPHIL # BLD AUTO: 0.09 K/UL
EOSINOPHIL NFR BLD AUTO: 1.6 %
ESTIMATED AVERAGE GLUCOSE: 111 MG/DL
FOLATE SERPL-MCNC: 7.3 NG/ML
GLUCOSE SERPL-MCNC: 92 MG/DL
HBA1C MFR BLD HPLC: 5.5 %
HCT VFR BLD CALC: 42.9 %
HGB BLD-MCNC: 13.9 G/DL
IMM GRANULOCYTES NFR BLD AUTO: 0.2 %
LYMPHOCYTES # BLD AUTO: 2.08 K/UL
LYMPHOCYTES NFR BLD AUTO: 36.8 %
MAGNESIUM SERPL-MCNC: 1.8 MG/DL
MAN DIFF?: NORMAL
MCHC RBC-ENTMCNC: 30.6 PG
MCHC RBC-ENTMCNC: 32.4 G/DL
MCV RBC AUTO: 94.5 FL
MONOCYTES # BLD AUTO: 0.36 K/UL
MONOCYTES NFR BLD AUTO: 6.4 %
NEUTROPHILS # BLD AUTO: 3.06 K/UL
NEUTROPHILS NFR BLD AUTO: 54.1 %
PLATELET # BLD AUTO: 256 K/UL
POTASSIUM SERPL-SCNC: 3.8 MMOL/L
PROT SERPL-MCNC: 7.2 G/DL
RBC # BLD: 4.54 M/UL
RBC # FLD: 14.2 %
SODIUM SERPL-SCNC: 142 MMOL/L
TSH SERPL-ACNC: 2.81 UIU/ML
VIT B12 SERPL-MCNC: 545 PG/ML
WBC # FLD AUTO: 5.65 K/UL

## 2025-05-13 ENCOUNTER — TRANSCRIPTION ENCOUNTER (OUTPATIENT)
Age: 60
End: 2025-05-13

## 2025-05-27 ENCOUNTER — RX RENEWAL (OUTPATIENT)
Age: 60
End: 2025-05-27

## 2025-05-30 ENCOUNTER — APPOINTMENT (OUTPATIENT)
Dept: FAMILY MEDICINE | Facility: CLINIC | Age: 60
End: 2025-05-30
Payer: MEDICAID

## 2025-05-30 VITALS
BODY MASS INDEX: 29.66 KG/M2 | RESPIRATION RATE: 17 BRPM | SYSTOLIC BLOOD PRESSURE: 129 MMHG | OXYGEN SATURATION: 96 % | HEIGHT: 65 IN | WEIGHT: 178 LBS | DIASTOLIC BLOOD PRESSURE: 87 MMHG | HEART RATE: 94 BPM

## 2025-05-30 VITALS — HEART RATE: 90 BPM | SYSTOLIC BLOOD PRESSURE: 112 MMHG | DIASTOLIC BLOOD PRESSURE: 68 MMHG

## 2025-05-30 DIAGNOSIS — R03.0 ELEVATED BLOOD-PRESSURE READING, W/OUT DIAGNOSIS OF HYPERTENSION: ICD-10-CM

## 2025-05-30 DIAGNOSIS — E78.1 PURE HYPERGLYCERIDEMIA: ICD-10-CM

## 2025-05-30 DIAGNOSIS — Z71.89 OTHER SPECIFIED COUNSELING: ICD-10-CM

## 2025-05-30 DIAGNOSIS — F41.9 ANXIETY DISORDER, UNSPECIFIED: ICD-10-CM

## 2025-05-30 DIAGNOSIS — Z76.0 ENCOUNTER FOR ISSUE OF REPEAT PRESCRIPTION: ICD-10-CM

## 2025-05-30 DIAGNOSIS — Z71.3 DIETARY COUNSELING AND SURVEILLANCE: ICD-10-CM

## 2025-05-30 DIAGNOSIS — E66.3 OVERWEIGHT: ICD-10-CM

## 2025-05-30 DIAGNOSIS — R21 RASH AND OTHER NONSPECIFIC SKIN ERUPTION: ICD-10-CM

## 2025-05-30 PROCEDURE — 99213 OFFICE O/P EST LOW 20 MIN: CPT

## 2025-06-03 ENCOUNTER — APPOINTMENT (OUTPATIENT)
Dept: INTERNAL MEDICINE | Facility: CLINIC | Age: 60
End: 2025-06-03

## 2025-06-03 DIAGNOSIS — E66.811 OBESITY, CLASS 1: ICD-10-CM

## 2025-06-03 PROCEDURE — 99203 OFFICE O/P NEW LOW 30 MIN: CPT | Mod: 95

## 2025-06-27 ENCOUNTER — APPOINTMENT (OUTPATIENT)
Dept: BARIATRICS | Facility: CLINIC | Age: 60
End: 2025-06-27

## 2025-06-27 VITALS
SYSTOLIC BLOOD PRESSURE: 148 MMHG | WEIGHT: 184 LBS | HEIGHT: 65 IN | DIASTOLIC BLOOD PRESSURE: 85 MMHG | OXYGEN SATURATION: 97 % | HEART RATE: 96 BPM | BODY MASS INDEX: 30.66 KG/M2 | TEMPERATURE: 97.6 F

## 2025-06-27 PROCEDURE — 97803 MED NUTRITION INDIV SUBSEQ: CPT

## 2025-06-30 ENCOUNTER — RX RENEWAL (OUTPATIENT)
Age: 60
End: 2025-06-30

## 2025-07-29 ENCOUNTER — TRANSCRIPTION ENCOUNTER (OUTPATIENT)
Age: 60
End: 2025-07-29

## 2025-07-31 ENCOUNTER — RX RENEWAL (OUTPATIENT)
Age: 60
End: 2025-07-31

## 2025-08-01 ENCOUNTER — APPOINTMENT (OUTPATIENT)
Dept: INTERNAL MEDICINE | Facility: CLINIC | Age: 60
End: 2025-08-01
Payer: MEDICAID

## 2025-08-01 DIAGNOSIS — E66.811 OBESITY, CLASS 1: ICD-10-CM

## 2025-08-01 PROCEDURE — 99213 OFFICE O/P EST LOW 20 MIN: CPT | Mod: 93

## 2025-08-18 ENCOUNTER — RX RENEWAL (OUTPATIENT)
Age: 60
End: 2025-08-18

## 2025-08-28 ENCOUNTER — TRANSCRIPTION ENCOUNTER (OUTPATIENT)
Age: 60
End: 2025-08-28

## 2025-08-29 ENCOUNTER — TRANSCRIPTION ENCOUNTER (OUTPATIENT)
Age: 60
End: 2025-08-29

## 2025-09-03 ENCOUNTER — RX RENEWAL (OUTPATIENT)
Age: 60
End: 2025-09-03

## 2025-09-03 ENCOUNTER — APPOINTMENT (OUTPATIENT)
Dept: FAMILY MEDICINE | Facility: CLINIC | Age: 60
End: 2025-09-03
Payer: MEDICAID

## 2025-09-03 VITALS
HEIGHT: 65 IN | BODY MASS INDEX: 31.16 KG/M2 | RESPIRATION RATE: 16 BRPM | WEIGHT: 187 LBS | TEMPERATURE: 97.9 F | SYSTOLIC BLOOD PRESSURE: 141 MMHG | HEART RATE: 85 BPM | OXYGEN SATURATION: 95 % | DIASTOLIC BLOOD PRESSURE: 86 MMHG

## 2025-09-03 DIAGNOSIS — E78.1 PURE HYPERGLYCERIDEMIA: ICD-10-CM

## 2025-09-03 DIAGNOSIS — E61.1 IRON DEFICIENCY: ICD-10-CM

## 2025-09-03 DIAGNOSIS — E66.3 OVERWEIGHT: ICD-10-CM

## 2025-09-03 DIAGNOSIS — R03.0 ELEVATED BLOOD-PRESSURE READING, W/OUT DIAGNOSIS OF HYPERTENSION: ICD-10-CM

## 2025-09-03 DIAGNOSIS — Z00.00 ENCOUNTER FOR GENERAL ADULT MEDICAL EXAMINATION W/OUT ABNORMAL FINDINGS: ICD-10-CM

## 2025-09-03 DIAGNOSIS — E66.811 OBESITY, CLASS 1: ICD-10-CM

## 2025-09-03 PROCEDURE — 99396 PREV VISIT EST AGE 40-64: CPT | Mod: 25

## 2025-09-04 ENCOUNTER — TRANSCRIPTION ENCOUNTER (OUTPATIENT)
Age: 60
End: 2025-09-04

## 2025-09-04 LAB
25(OH)D3 SERPL-MCNC: 22 NG/ML
ALBUMIN SERPL ELPH-MCNC: 4.7 G/DL
ALP BLD-CCNC: 100 U/L
ALT SERPL-CCNC: 13 U/L
ANION GAP SERPL CALC-SCNC: 15 MMOL/L
AST SERPL-CCNC: 19 U/L
BASOPHILS # BLD AUTO: 0.05 K/UL
BASOPHILS NFR BLD AUTO: 0.9 %
BILIRUB SERPL-MCNC: 0.4 MG/DL
BUN SERPL-MCNC: 9 MG/DL
CALCIUM SERPL-MCNC: 9.8 MG/DL
CHLORIDE SERPL-SCNC: 103 MMOL/L
CHOLEST SERPL-MCNC: 214 MG/DL
CO2 SERPL-SCNC: 23 MMOL/L
CREAT SERPL-MCNC: 0.76 MG/DL
EGFRCR SERPLBLD CKD-EPI 2021: 91 ML/MIN/1.73M2
EOSINOPHIL # BLD AUTO: 0.14 K/UL
EOSINOPHIL NFR BLD AUTO: 2.5 %
ESTIMATED AVERAGE GLUCOSE: 114 MG/DL
GLUCOSE SERPL-MCNC: 99 MG/DL
HBA1C MFR BLD HPLC: 5.6 %
HCT VFR BLD CALC: 43.5 %
HDLC SERPL-MCNC: 78 MG/DL
HGB BLD-MCNC: 14 G/DL
IMM GRANULOCYTES NFR BLD AUTO: 0.2 %
LDLC SERPL-MCNC: 112 MG/DL
LYMPHOCYTES # BLD AUTO: 2.05 K/UL
LYMPHOCYTES NFR BLD AUTO: 37.1 %
MAN DIFF?: NORMAL
MCHC RBC-ENTMCNC: 29.4 PG
MCHC RBC-ENTMCNC: 32.2 G/DL
MCV RBC AUTO: 91.4 FL
MONOCYTES # BLD AUTO: 0.38 K/UL
MONOCYTES NFR BLD AUTO: 6.9 %
NEUTROPHILS # BLD AUTO: 2.89 K/UL
NEUTROPHILS NFR BLD AUTO: 52.4 %
NONHDLC SERPL-MCNC: 136 MG/DL
PLATELET # BLD AUTO: 225 K/UL
POTASSIUM SERPL-SCNC: 4 MMOL/L
PROT SERPL-MCNC: 7.3 G/DL
RBC # BLD: 4.76 M/UL
RBC # FLD: 13.2 %
SODIUM SERPL-SCNC: 140 MMOL/L
TRIGL SERPL-MCNC: 139 MG/DL
TSH SERPL-ACNC: 3.93 UIU/ML
WBC # FLD AUTO: 5.52 K/UL